# Patient Record
Sex: MALE | Race: BLACK OR AFRICAN AMERICAN | Employment: FULL TIME | URBAN - METROPOLITAN AREA
[De-identification: names, ages, dates, MRNs, and addresses within clinical notes are randomized per-mention and may not be internally consistent; named-entity substitution may affect disease eponyms.]

---

## 2017-02-06 ENCOUNTER — TELEPHONE (OUTPATIENT)
Dept: PRIMARY CARE CLINIC | Age: 42
End: 2017-02-06

## 2017-02-15 ENCOUNTER — OFFICE VISIT (OUTPATIENT)
Dept: PRIMARY CARE CLINIC | Age: 42
End: 2017-02-15

## 2017-02-15 VITALS
WEIGHT: 183 LBS | HEIGHT: 68 IN | HEART RATE: 92 BPM | SYSTOLIC BLOOD PRESSURE: 198 MMHG | TEMPERATURE: 98.6 F | OXYGEN SATURATION: 96 % | DIASTOLIC BLOOD PRESSURE: 110 MMHG | BODY MASS INDEX: 27.74 KG/M2

## 2017-02-15 DIAGNOSIS — N18.30 CKD (CHRONIC KIDNEY DISEASE), STAGE III (HCC): ICD-10-CM

## 2017-02-15 DIAGNOSIS — R80.9 PROTEINURIA: ICD-10-CM

## 2017-02-15 DIAGNOSIS — I10 ESSENTIAL HYPERTENSION: Primary | ICD-10-CM

## 2017-02-15 LAB
A/G RATIO: 1 (ref 1.1–2.2)
ALBUMIN SERPL-MCNC: 3.6 G/DL (ref 3.4–5)
ALP BLD-CCNC: 93 U/L (ref 40–129)
ALT SERPL-CCNC: 16 U/L (ref 10–40)
ANION GAP SERPL CALCULATED.3IONS-SCNC: 19 MMOL/L (ref 3–16)
AST SERPL-CCNC: 7 U/L (ref 15–37)
BASOPHILS ABSOLUTE: 0.1 K/UL (ref 0–0.2)
BASOPHILS RELATIVE PERCENT: 1.3 %
BILIRUB SERPL-MCNC: <0.2 MG/DL (ref 0–1)
BILIRUBIN URINE: NEGATIVE
BLOOD, URINE: ABNORMAL
BUN BLDV-MCNC: 104 MG/DL (ref 7–20)
CALCIUM SERPL-MCNC: 6.2 MG/DL (ref 8.3–10.6)
CHLORIDE BLD-SCNC: 107 MMOL/L (ref 99–110)
CHOLESTEROL, TOTAL: 148 MG/DL (ref 0–199)
CLARITY: CLEAR
CO2: 15 MMOL/L (ref 21–32)
COLOR: YELLOW
CREAT SERPL-MCNC: 13 MG/DL (ref 0.9–1.3)
EOSINOPHILS ABSOLUTE: 0.1 K/UL (ref 0–0.6)
EOSINOPHILS RELATIVE PERCENT: 2 %
EPITHELIAL CELLS, UA: 1 /HPF (ref 0–5)
GFR AFRICAN AMERICAN: 5
GFR NON-AFRICAN AMERICAN: 4
GLOBULIN: 3.5 G/DL
GLUCOSE BLD-MCNC: 78 MG/DL (ref 70–99)
GLUCOSE URINE: NEGATIVE MG/DL
HCT VFR BLD CALC: 21.4 % (ref 40.5–52.5)
HDLC SERPL-MCNC: 41 MG/DL (ref 40–60)
HEMOGLOBIN: 6.8 G/DL (ref 13.5–17.5)
HYALINE CASTS: 0 /HPF (ref 0–8)
KETONES, URINE: NEGATIVE MG/DL
LDL CHOLESTEROL CALCULATED: 90 MG/DL
LEUKOCYTE ESTERASE, URINE: NEGATIVE
LYMPHOCYTES ABSOLUTE: 0.6 K/UL (ref 1–5.1)
LYMPHOCYTES RELATIVE PERCENT: 13.6 %
MCH RBC QN AUTO: 30.5 PG (ref 26–34)
MCHC RBC AUTO-ENTMCNC: 31.7 G/DL (ref 31–36)
MCV RBC AUTO: 96.1 FL (ref 80–100)
MICROSCOPIC EXAMINATION: YES
MONOCYTES ABSOLUTE: 0.6 K/UL (ref 0–1.3)
MONOCYTES RELATIVE PERCENT: 12.6 %
NEUTROPHILS ABSOLUTE: 3.1 K/UL (ref 1.7–7.7)
NEUTROPHILS RELATIVE PERCENT: 70.5 %
NITRITE, URINE: NEGATIVE
PDW BLD-RTO: 12.3 % (ref 12.4–15.4)
PH UA: 6
PLATELET # BLD: 212 K/UL (ref 135–450)
PMV BLD AUTO: 8.7 FL (ref 5–10.5)
POTASSIUM SERPL-SCNC: 6.5 MMOL/L (ref 3.5–5.1)
PROTEIN UA: >=300 MG/DL
RBC # BLD: 2.22 M/UL (ref 4.2–5.9)
RBC UA: 6 /HPF (ref 0–4)
SODIUM BLD-SCNC: 141 MMOL/L (ref 136–145)
SPECIFIC GRAVITY UA: 1.01
TOTAL PROTEIN: 7.1 G/DL (ref 6.4–8.2)
TRIGL SERPL-MCNC: 87 MG/DL (ref 0–150)
TSH SERPL DL<=0.05 MIU/L-ACNC: 1.8 UIU/ML (ref 0.27–4.2)
URINE TYPE: ABNORMAL
UROBILINOGEN, URINE: 0.2 E.U./DL
VLDLC SERPL CALC-MCNC: 17 MG/DL
WBC # BLD: 4.4 K/UL (ref 4–11)
WBC UA: 6 /HPF (ref 0–5)

## 2017-02-15 PROCEDURE — 99215 OFFICE O/P EST HI 40 MIN: CPT | Performed by: INTERNAL MEDICINE

## 2017-02-15 RX ORDER — AMLODIPINE BESYLATE 10 MG/1
10 TABLET ORAL DAILY
Qty: 30 TABLET | Refills: 2 | Status: ON HOLD | OUTPATIENT
Start: 2017-02-15 | End: 2017-02-20 | Stop reason: HOSPADM

## 2017-02-15 RX ORDER — HYDROCHLOROTHIAZIDE 25 MG/1
25 TABLET ORAL DAILY
Qty: 30 TABLET | Refills: 2 | Status: ON HOLD | OUTPATIENT
Start: 2017-02-15 | End: 2017-02-20 | Stop reason: HOSPADM

## 2017-02-15 ASSESSMENT — ENCOUNTER SYMPTOMS
BLURRED VISION: 0
SHORTNESS OF BREATH: 0
RESPIRATORY NEGATIVE: 1
EYE REDNESS: 0
EYE PAIN: 0
PHOTOPHOBIA: 0
ORTHOPNEA: 0
EYE DISCHARGE: 0
GASTROINTESTINAL NEGATIVE: 1
EYE ITCHING: 0

## 2017-02-22 ENCOUNTER — OFFICE VISIT (OUTPATIENT)
Dept: PRIMARY CARE CLINIC | Age: 42
End: 2017-02-22

## 2017-02-22 VITALS
WEIGHT: 174 LBS | SYSTOLIC BLOOD PRESSURE: 147 MMHG | OXYGEN SATURATION: 98 % | HEART RATE: 78 BPM | DIASTOLIC BLOOD PRESSURE: 95 MMHG | HEIGHT: 68 IN | BODY MASS INDEX: 26.37 KG/M2 | TEMPERATURE: 97.5 F

## 2017-02-22 DIAGNOSIS — N18.6 ESRD (END STAGE RENAL DISEASE) (HCC): ICD-10-CM

## 2017-02-22 DIAGNOSIS — N17.9 AKI (ACUTE KIDNEY INJURY) (HCC): ICD-10-CM

## 2017-02-22 DIAGNOSIS — I10 ESSENTIAL HYPERTENSION: ICD-10-CM

## 2017-02-22 PROCEDURE — 99213 OFFICE O/P EST LOW 20 MIN: CPT | Performed by: INTERNAL MEDICINE

## 2017-02-23 ENCOUNTER — HOSPITAL ENCOUNTER (OUTPATIENT)
Dept: OTHER | Age: 42
Discharge: HOME | End: 2017-02-23
Attending: STUDENT IN AN ORGANIZED HEALTH CARE EDUCATION/TRAINING PROGRAM | Admitting: STUDENT IN AN ORGANIZED HEALTH CARE EDUCATION/TRAINING PROGRAM

## 2017-02-23 DIAGNOSIS — N18.30 CKD (CHRONIC KIDNEY DISEASE), STAGE III (HCC): Primary | ICD-10-CM

## 2017-03-17 ENCOUNTER — OFFICE VISIT (OUTPATIENT)
Dept: PRIMARY CARE CLINIC | Age: 42
End: 2017-03-17

## 2017-03-17 VITALS
BODY MASS INDEX: 26.61 KG/M2 | RESPIRATION RATE: 18 BRPM | WEIGHT: 175 LBS | DIASTOLIC BLOOD PRESSURE: 84 MMHG | OXYGEN SATURATION: 99 % | TEMPERATURE: 97.4 F | SYSTOLIC BLOOD PRESSURE: 139 MMHG | HEART RATE: 85 BPM

## 2017-03-17 DIAGNOSIS — N17.9 AKI (ACUTE KIDNEY INJURY) (HCC): ICD-10-CM

## 2017-03-17 DIAGNOSIS — N18.6 ESRD (END STAGE RENAL DISEASE) (HCC): ICD-10-CM

## 2017-03-17 DIAGNOSIS — I10 ESSENTIAL HYPERTENSION: Primary | ICD-10-CM

## 2017-03-17 DIAGNOSIS — N18.30 CKD (CHRONIC KIDNEY DISEASE), STAGE III (HCC): ICD-10-CM

## 2017-03-17 PROCEDURE — 99214 OFFICE O/P EST MOD 30 MIN: CPT | Performed by: INTERNAL MEDICINE

## 2017-03-17 RX ORDER — LOSARTAN POTASSIUM 50 MG/1
50 TABLET ORAL DAILY
Qty: 30 TABLET | Refills: 0 | Status: CANCELLED | OUTPATIENT
Start: 2017-03-17

## 2017-03-17 RX ORDER — NIFEDIPINE 90 MG/1
90 TABLET, EXTENDED RELEASE ORAL DAILY
Qty: 30 TABLET | Refills: 5 | Status: SHIPPED | OUTPATIENT
Start: 2017-03-17 | End: 2017-06-26 | Stop reason: SDUPTHER

## 2017-03-17 RX ORDER — LOSARTAN POTASSIUM 50 MG/1
50 TABLET ORAL DAILY
Qty: 30 TABLET | Refills: 5 | Status: SHIPPED | OUTPATIENT
Start: 2017-03-17 | End: 2017-06-26 | Stop reason: SDUPTHER

## 2017-03-17 RX ORDER — METOPROLOL SUCCINATE 25 MG/1
25 TABLET, EXTENDED RELEASE ORAL DAILY
Qty: 30 TABLET | Refills: 5 | Status: SHIPPED | OUTPATIENT
Start: 2017-03-17 | End: 2017-08-22 | Stop reason: SDUPTHER

## 2017-03-17 ASSESSMENT — ENCOUNTER SYMPTOMS
RESPIRATORY NEGATIVE: 1
EYE REDNESS: 0
GASTROINTESTINAL NEGATIVE: 1
EYE DISCHARGE: 0
PHOTOPHOBIA: 0
EYE ITCHING: 0
ORTHOPNEA: 0
BLURRED VISION: 0
SHORTNESS OF BREATH: 0
EYE PAIN: 0

## 2017-03-17 ASSESSMENT — PATIENT HEALTH QUESTIONNAIRE - PHQ9
SUM OF ALL RESPONSES TO PHQ QUESTIONS 1-9: 0
1. LITTLE INTEREST OR PLEASURE IN DOING THINGS: 0
2. FEELING DOWN, DEPRESSED OR HOPELESS: 0
SUM OF ALL RESPONSES TO PHQ9 QUESTIONS 1 & 2: 0

## 2017-03-30 ENCOUNTER — TELEPHONE (OUTPATIENT)
Dept: PRIMARY CARE CLINIC | Age: 42
End: 2017-03-30

## 2017-04-03 RX ORDER — CALCIUM ACETATE 667 MG/1
CAPSULE ORAL
Qty: 60 CAPSULE | Refills: 2 | OUTPATIENT
Start: 2017-04-03

## 2017-04-04 DIAGNOSIS — N18.30 CKD (CHRONIC KIDNEY DISEASE), STAGE III (HCC): Primary | ICD-10-CM

## 2017-04-20 ENCOUNTER — OFFICE VISIT (OUTPATIENT)
Dept: VASCULAR SURGERY | Age: 42
End: 2017-04-20

## 2017-04-20 VITALS
WEIGHT: 171 LBS | HEIGHT: 68 IN | DIASTOLIC BLOOD PRESSURE: 88 MMHG | BODY MASS INDEX: 25.91 KG/M2 | SYSTOLIC BLOOD PRESSURE: 134 MMHG

## 2017-04-20 DIAGNOSIS — N18.6 ESRD (END STAGE RENAL DISEASE) (HCC): Primary | ICD-10-CM

## 2017-04-20 PROCEDURE — 99205 OFFICE O/P NEW HI 60 MIN: CPT | Performed by: SURGERY

## 2017-04-20 ASSESSMENT — ENCOUNTER SYMPTOMS
ALLERGIC/IMMUNOLOGIC NEGATIVE: 1
EYES NEGATIVE: 1
RESPIRATORY NEGATIVE: 1
GASTROINTESTINAL NEGATIVE: 1

## 2017-05-23 ENCOUNTER — HOSPITAL ENCOUNTER (OUTPATIENT)
Dept: PREADMISSION TESTING | Age: 42
Discharge: HOME | End: 2017-05-23
Admitting: SURGERY

## 2017-05-23 ENCOUNTER — TELEPHONE (OUTPATIENT)
Dept: CARDIOTHORACIC SURGERY | Age: 42
End: 2017-05-23

## 2017-05-23 ENCOUNTER — TELEPHONE (OUTPATIENT)
Dept: VASCULAR SURGERY | Age: 42
End: 2017-05-23

## 2017-05-23 ENCOUNTER — HOSPITAL ENCOUNTER (OUTPATIENT)
Dept: OTHER | Age: 42
Discharge: HOME | End: 2017-05-23
Attending: SURGERY | Admitting: SURGERY

## 2017-05-23 VITALS — WEIGHT: 170 LBS | HEIGHT: 68 IN | BODY MASS INDEX: 25.76 KG/M2

## 2017-05-23 DIAGNOSIS — Z01.811 PRE-OP CHEST EXAM: ICD-10-CM

## 2017-05-23 LAB
A/G RATIO: 1.1 (ref 1.1–2.2)
ALBUMIN SERPL-MCNC: 4.6 G/DL (ref 3.4–5)
ALP BLD-CCNC: 116 U/L (ref 40–129)
ALT SERPL-CCNC: 23 U/L (ref 10–40)
ANION GAP SERPL CALCULATED.3IONS-SCNC: 10 MMOL/L (ref 3–16)
AST SERPL-CCNC: 14 U/L (ref 15–37)
BILIRUB SERPL-MCNC: 0.3 MG/DL (ref 0–1)
BILIRUBIN URINE: NEGATIVE
BLOOD, URINE: ABNORMAL
BUN BLDV-MCNC: 27 MG/DL (ref 7–20)
CALCIUM SERPL-MCNC: 9.5 MG/DL (ref 8.3–10.6)
CHLORIDE BLD-SCNC: 96 MMOL/L (ref 99–110)
CLARITY: ABNORMAL
CO2: 32 MMOL/L (ref 21–32)
COLOR: YELLOW
CREAT SERPL-MCNC: 8.1 MG/DL (ref 0.9–1.3)
EKG ATRIAL RATE: 62 BPM
EKG DIAGNOSIS: NORMAL
EKG P AXIS: 76 DEGREES
EKG P-R INTERVAL: 148 MS
EKG Q-T INTERVAL: 376 MS
EKG QRS DURATION: 80 MS
EKG QTC CALCULATION (BAZETT): 381 MS
EKG R AXIS: 52 DEGREES
EKG T AXIS: 43 DEGREES
EKG VENTRICULAR RATE: 62 BPM
EPITHELIAL CELLS, UA: 0 /HPF (ref 0–5)
GFR AFRICAN AMERICAN: 9
GFR NON-AFRICAN AMERICAN: 7
GLOBULIN: 4.3 G/DL
GLUCOSE BLD-MCNC: 81 MG/DL (ref 70–99)
GLUCOSE URINE: NEGATIVE MG/DL
HCT VFR BLD CALC: 35.3 % (ref 40.5–52.5)
HEMOGLOBIN: 11.5 G/DL (ref 13.5–17.5)
HYALINE CASTS: 0 /LPF (ref 0–8)
INR BLD: 0.92 (ref 0.85–1.15)
KETONES, URINE: NEGATIVE MG/DL
LEUKOCYTE ESTERASE, URINE: ABNORMAL
MCH RBC QN AUTO: 31.4 PG (ref 26–34)
MCHC RBC AUTO-ENTMCNC: 32.5 G/DL (ref 31–36)
MCV RBC AUTO: 96.5 FL (ref 80–100)
MICROSCOPIC EXAMINATION: YES
NITRITE, URINE: NEGATIVE
PDW BLD-RTO: 15.9 % (ref 12.4–15.4)
PH UA: 8.5
PLATELET # BLD: 317 K/UL (ref 135–450)
PMV BLD AUTO: 8.1 FL (ref 5–10.5)
POTASSIUM SERPL-SCNC: 4.8 MMOL/L (ref 3.5–5.1)
PROTEIN UA: >=300 MG/DL
PROTHROMBIN TIME: 10.4 SEC (ref 9.6–13)
RBC # BLD: 3.66 M/UL (ref 4.2–5.9)
RBC UA: 2 /HPF (ref 0–4)
SODIUM BLD-SCNC: 138 MMOL/L (ref 136–145)
SPECIFIC GRAVITY UA: 1.01
TOTAL PROTEIN: 8.9 G/DL (ref 6.4–8.2)
URINE TYPE: ABNORMAL
UROBILINOGEN, URINE: 0.2 E.U./DL
WBC # BLD: 5 K/UL (ref 4–11)
WBC UA: 12 /HPF (ref 0–5)

## 2017-05-23 PROCEDURE — 93010 ELECTROCARDIOGRAM REPORT: CPT | Performed by: INTERNAL MEDICINE

## 2017-05-23 RX ORDER — SODIUM CHLORIDE, SODIUM LACTATE, POTASSIUM CHLORIDE, CALCIUM CHLORIDE 600; 310; 30; 20 MG/100ML; MG/100ML; MG/100ML; MG/100ML
INJECTION, SOLUTION INTRAVENOUS CONTINUOUS
Status: CANCELLED | OUTPATIENT
Start: 2017-05-31

## 2017-05-23 RX ORDER — LIDOCAINE HYDROCHLORIDE 10 MG/ML
0.5 INJECTION, SOLUTION EPIDURAL; INFILTRATION; INTRACAUDAL; PERINEURAL ONCE
Status: CANCELLED | OUTPATIENT
Start: 2017-05-31

## 2017-05-31 ENCOUNTER — HOSPITAL ENCOUNTER (OUTPATIENT)
Dept: OTHER | Age: 42
Discharge: HOME | End: 2017-05-31
Attending: SURGERY | Admitting: SURGERY

## 2017-05-31 ENCOUNTER — HOSPITAL ENCOUNTER (OUTPATIENT)
Dept: SURGERY | Age: 42
Discharge: HOME | End: 2017-05-31
Admitting: SURGERY

## 2017-06-14 ENCOUNTER — HOSPITAL ENCOUNTER (OUTPATIENT)
Dept: SURGERY | Age: 42
Discharge: HOME | End: 2017-06-14
Attending: SURGERY | Admitting: SURGERY

## 2017-06-14 VITALS
DIASTOLIC BLOOD PRESSURE: 100 MMHG | SYSTOLIC BLOOD PRESSURE: 172 MMHG | OXYGEN SATURATION: 98 % | RESPIRATION RATE: 12 BRPM | HEART RATE: 68 BPM | BODY MASS INDEX: 26.15 KG/M2 | TEMPERATURE: 97.4 F | WEIGHT: 171.96 LBS

## 2017-06-14 LAB
ANION GAP SERPL CALCULATED.3IONS-SCNC: 17 MMOL/L (ref 3–16)
BUN BLDV-MCNC: 47 MG/DL (ref 7–20)
CALCIUM SERPL-MCNC: 8.9 MG/DL (ref 8.3–10.6)
CHLORIDE BLD-SCNC: 97 MMOL/L (ref 99–110)
CO2: 22 MMOL/L (ref 21–32)
CREAT SERPL-MCNC: 11.8 MG/DL (ref 0.9–1.3)
GFR AFRICAN AMERICAN: 6
GFR NON-AFRICAN AMERICAN: 5
GLUCOSE BLD-MCNC: 89 MG/DL (ref 70–99)
POTASSIUM SERPL-SCNC: 5.6 MMOL/L (ref 3.5–5.1)
SODIUM BLD-SCNC: 136 MMOL/L (ref 136–145)

## 2017-06-14 PROCEDURE — 36825 ARTERY-VEIN AUTOGRAFT: CPT | Performed by: SURGERY

## 2017-06-14 RX ORDER — ONDANSETRON 2 MG/ML
4 INJECTION INTRAMUSCULAR; INTRAVENOUS
Status: ACTIVE | OUTPATIENT
Start: 2017-06-14 | End: 2017-06-14

## 2017-06-14 RX ORDER — LABETALOL HYDROCHLORIDE 5 MG/ML
5 INJECTION, SOLUTION INTRAVENOUS ONCE
Status: COMPLETED | OUTPATIENT
Start: 2017-06-14 | End: 2017-06-14

## 2017-06-14 RX ORDER — MEPERIDINE HYDROCHLORIDE 25 MG/ML
12.5 INJECTION INTRAMUSCULAR; INTRAVENOUS; SUBCUTANEOUS EVERY 5 MIN PRN
Status: DISCONTINUED | OUTPATIENT
Start: 2017-06-14 | End: 2017-06-15 | Stop reason: HOSPADM

## 2017-06-14 RX ORDER — LABETALOL HYDROCHLORIDE 5 MG/ML
5 INJECTION, SOLUTION INTRAVENOUS EVERY 10 MIN PRN
Status: DISCONTINUED | OUTPATIENT
Start: 2017-06-14 | End: 2017-06-15 | Stop reason: HOSPADM

## 2017-06-14 RX ORDER — CEFAZOLIN SODIUM 2 G/100ML
2 INJECTION, SOLUTION INTRAVENOUS ONCE
Status: COMPLETED | OUTPATIENT
Start: 2017-06-14 | End: 2017-06-14

## 2017-06-14 RX ORDER — DIPHENHYDRAMINE HYDROCHLORIDE 50 MG/ML
12.5 INJECTION INTRAMUSCULAR; INTRAVENOUS
Status: ACTIVE | OUTPATIENT
Start: 2017-06-14 | End: 2017-06-14

## 2017-06-14 RX ORDER — OXYCODONE HYDROCHLORIDE AND ACETAMINOPHEN 5; 325 MG/1; MG/1
1 TABLET ORAL PRN
Status: ACTIVE | OUTPATIENT
Start: 2017-06-14 | End: 2017-06-14

## 2017-06-14 RX ORDER — HYDRALAZINE HYDROCHLORIDE 20 MG/ML
5 INJECTION INTRAMUSCULAR; INTRAVENOUS ONCE
Status: COMPLETED | OUTPATIENT
Start: 2017-06-14 | End: 2017-06-14

## 2017-06-14 RX ORDER — SODIUM CHLORIDE 9 MG/ML
INJECTION, SOLUTION INTRAVENOUS CONTINUOUS
Status: DISCONTINUED | OUTPATIENT
Start: 2017-06-14 | End: 2017-06-15 | Stop reason: HOSPADM

## 2017-06-14 RX ORDER — HYDRALAZINE HYDROCHLORIDE 20 MG/ML
INJECTION INTRAMUSCULAR; INTRAVENOUS
Status: COMPLETED
Start: 2017-06-14 | End: 2017-06-14

## 2017-06-14 RX ORDER — OXYCODONE HYDROCHLORIDE AND ACETAMINOPHEN 5; 325 MG/1; MG/1
2 TABLET ORAL PRN
Status: ACTIVE | OUTPATIENT
Start: 2017-06-14 | End: 2017-06-14

## 2017-06-14 RX ORDER — HYDROCODONE BITARTRATE AND ACETAMINOPHEN 5; 325 MG/1; MG/1
2 TABLET ORAL EVERY 6 HOURS PRN
Qty: 25 TABLET | Refills: 0 | Status: SHIPPED | OUTPATIENT
Start: 2017-06-14 | End: 2017-12-26

## 2017-06-14 RX ORDER — HYDROMORPHONE HCL 110MG/55ML
0.5 PATIENT CONTROLLED ANALGESIA SYRINGE INTRAVENOUS EVERY 5 MIN PRN
Status: DISCONTINUED | OUTPATIENT
Start: 2017-06-14 | End: 2017-06-15 | Stop reason: HOSPADM

## 2017-06-14 RX ORDER — LIDOCAINE HYDROCHLORIDE 10 MG/ML
0.5 INJECTION, SOLUTION EPIDURAL; INFILTRATION; INTRACAUDAL; PERINEURAL ONCE
Status: DISCONTINUED | OUTPATIENT
Start: 2017-06-14 | End: 2017-06-15 | Stop reason: HOSPADM

## 2017-06-14 RX ORDER — FENTANYL CITRATE 50 UG/ML
25 INJECTION, SOLUTION INTRAMUSCULAR; INTRAVENOUS EVERY 5 MIN PRN
Status: DISCONTINUED | OUTPATIENT
Start: 2017-06-14 | End: 2017-06-15 | Stop reason: HOSPADM

## 2017-06-14 RX ADMIN — CEFAZOLIN SODIUM 2 G: 2 INJECTION, SOLUTION INTRAVENOUS at 13:11

## 2017-06-14 RX ADMIN — HYDRALAZINE HYDROCHLORIDE 5 MG: 20 INJECTION INTRAMUSCULAR; INTRAVENOUS at 16:10

## 2017-06-14 RX ADMIN — SODIUM CHLORIDE: 9 INJECTION, SOLUTION INTRAVENOUS at 11:38

## 2017-06-14 RX ADMIN — LABETALOL HYDROCHLORIDE 5 MG: 5 INJECTION, SOLUTION INTRAVENOUS at 15:32

## 2017-06-14 ASSESSMENT — PAIN - FUNCTIONAL ASSESSMENT: PAIN_FUNCTIONAL_ASSESSMENT: 0-10

## 2017-06-16 ENCOUNTER — OFFICE VISIT (OUTPATIENT)
Dept: PRIMARY CARE CLINIC | Age: 42
End: 2017-06-16

## 2017-06-16 VITALS
HEART RATE: 71 BPM | TEMPERATURE: 98.4 F | SYSTOLIC BLOOD PRESSURE: 136 MMHG | WEIGHT: 175 LBS | OXYGEN SATURATION: 98 % | BODY MASS INDEX: 26.61 KG/M2 | RESPIRATION RATE: 18 BRPM | DIASTOLIC BLOOD PRESSURE: 81 MMHG

## 2017-06-16 DIAGNOSIS — Z11.4 SCREENING FOR HIV (HUMAN IMMUNODEFICIENCY VIRUS): ICD-10-CM

## 2017-06-16 DIAGNOSIS — I10 ESSENTIAL HYPERTENSION: Primary | ICD-10-CM

## 2017-06-16 DIAGNOSIS — N18.6 ESRD (END STAGE RENAL DISEASE) ON DIALYSIS (HCC): ICD-10-CM

## 2017-06-16 DIAGNOSIS — Z23 NEED FOR TDAP VACCINATION: ICD-10-CM

## 2017-06-16 DIAGNOSIS — Z13.5 SCREENING FOR GLAUCOMA: ICD-10-CM

## 2017-06-16 DIAGNOSIS — Z99.2 ESRD (END STAGE RENAL DISEASE) ON DIALYSIS (HCC): ICD-10-CM

## 2017-06-16 DIAGNOSIS — Z11.4 SCREENING FOR HIV WITHOUT PRESENCE OF RISK FACTORS: ICD-10-CM

## 2017-06-16 PROCEDURE — 99213 OFFICE O/P EST LOW 20 MIN: CPT | Performed by: INTERNAL MEDICINE

## 2017-06-16 ASSESSMENT — ENCOUNTER SYMPTOMS
SHORTNESS OF BREATH: 0
BLURRED VISION: 0
RESPIRATORY NEGATIVE: 1
ORTHOPNEA: 0
EYE DISCHARGE: 0
EYES NEGATIVE: 1

## 2017-06-22 ENCOUNTER — OFFICE VISIT (OUTPATIENT)
Dept: VASCULAR SURGERY | Age: 42
End: 2017-06-22

## 2017-06-22 VITALS
WEIGHT: 171 LBS | SYSTOLIC BLOOD PRESSURE: 110 MMHG | HEIGHT: 68 IN | BODY MASS INDEX: 25.91 KG/M2 | DIASTOLIC BLOOD PRESSURE: 78 MMHG

## 2017-06-22 DIAGNOSIS — N18.6 ESRD (END STAGE RENAL DISEASE) (HCC): Primary | ICD-10-CM

## 2017-06-22 PROCEDURE — 99024 POSTOP FOLLOW-UP VISIT: CPT | Performed by: SURGERY

## 2017-06-26 DIAGNOSIS — I10 ESSENTIAL HYPERTENSION: ICD-10-CM

## 2017-06-26 DIAGNOSIS — N18.30 CKD (CHRONIC KIDNEY DISEASE), STAGE III (HCC): ICD-10-CM

## 2017-06-27 RX ORDER — NIFEDIPINE 90 MG/1
90 TABLET, EXTENDED RELEASE ORAL DAILY
Qty: 30 TABLET | Refills: 5 | Status: SHIPPED | OUTPATIENT
Start: 2017-06-27 | End: 2017-07-27 | Stop reason: SDUPTHER

## 2017-06-27 RX ORDER — LOSARTAN POTASSIUM 50 MG/1
50 TABLET ORAL DAILY
Qty: 30 TABLET | Refills: 5 | Status: SHIPPED | OUTPATIENT
Start: 2017-06-27 | End: 2017-07-27 | Stop reason: SDUPTHER

## 2017-07-13 ENCOUNTER — OFFICE VISIT (OUTPATIENT)
Dept: VASCULAR SURGERY | Age: 42
End: 2017-07-13

## 2017-07-13 VITALS
HEIGHT: 68 IN | WEIGHT: 171 LBS | SYSTOLIC BLOOD PRESSURE: 130 MMHG | DIASTOLIC BLOOD PRESSURE: 68 MMHG | BODY MASS INDEX: 25.91 KG/M2

## 2017-07-13 DIAGNOSIS — N18.6 ESRD (END STAGE RENAL DISEASE) (HCC): Primary | ICD-10-CM

## 2017-07-13 PROCEDURE — 99024 POSTOP FOLLOW-UP VISIT: CPT | Performed by: SURGERY

## 2017-07-13 RX ORDER — SEVELAMER CARBONATE 800 MG/1
TABLET, FILM COATED ORAL
Refills: 6 | COMMUNITY
Start: 2017-07-11 | End: 2018-02-20

## 2017-07-27 DIAGNOSIS — I10 ESSENTIAL HYPERTENSION: ICD-10-CM

## 2017-07-27 DIAGNOSIS — N18.30 CKD (CHRONIC KIDNEY DISEASE), STAGE III (HCC): ICD-10-CM

## 2017-07-27 RX ORDER — NIFEDIPINE 90 MG/1
90 TABLET, EXTENDED RELEASE ORAL DAILY
Qty: 30 TABLET | Refills: 5 | Status: ON HOLD | OUTPATIENT
Start: 2017-07-27 | End: 2017-08-29 | Stop reason: HOSPADM

## 2017-07-27 RX ORDER — LOSARTAN POTASSIUM 50 MG/1
50 TABLET ORAL DAILY
Qty: 30 TABLET | Refills: 5 | Status: SHIPPED | OUTPATIENT
Start: 2017-07-27 | End: 2017-09-25 | Stop reason: SDUPTHER

## 2017-08-22 DIAGNOSIS — I10 ESSENTIAL HYPERTENSION: ICD-10-CM

## 2017-08-22 RX ORDER — METOPROLOL SUCCINATE 25 MG/1
25 TABLET, EXTENDED RELEASE ORAL DAILY
Qty: 30 TABLET | Refills: 5 | Status: SHIPPED | OUTPATIENT
Start: 2017-08-22 | End: 2018-05-18 | Stop reason: SDUPTHER

## 2017-08-27 PROBLEM — N18.5 ANEMIA OF CHRONIC RENAL FAILURE, STAGE 5 (HCC): Status: ACTIVE | Noted: 2017-08-27

## 2017-08-29 ENCOUNTER — TELEPHONE (OUTPATIENT)
Dept: SLEEP MEDICINE | Age: 42
End: 2017-08-29

## 2017-09-15 ENCOUNTER — OFFICE VISIT (OUTPATIENT)
Dept: PRIMARY CARE CLINIC | Age: 42
End: 2017-09-15

## 2017-09-15 VITALS
BODY MASS INDEX: 25.76 KG/M2 | SYSTOLIC BLOOD PRESSURE: 117 MMHG | HEART RATE: 71 BPM | OXYGEN SATURATION: 96 % | RESPIRATION RATE: 18 BRPM | HEIGHT: 68 IN | TEMPERATURE: 97.6 F | DIASTOLIC BLOOD PRESSURE: 74 MMHG | WEIGHT: 170 LBS

## 2017-09-15 DIAGNOSIS — N18.6 ESRD (END STAGE RENAL DISEASE) ON DIALYSIS (HCC): ICD-10-CM

## 2017-09-15 DIAGNOSIS — I48.91 ATRIAL FIBRILLATION WITH RVR (HCC): ICD-10-CM

## 2017-09-15 DIAGNOSIS — Z99.2 ESRD (END STAGE RENAL DISEASE) ON DIALYSIS (HCC): ICD-10-CM

## 2017-09-15 PROCEDURE — 99213 OFFICE O/P EST LOW 20 MIN: CPT | Performed by: INTERNAL MEDICINE

## 2017-09-25 DIAGNOSIS — I10 ESSENTIAL HYPERTENSION: ICD-10-CM

## 2017-09-25 DIAGNOSIS — N18.30 CKD (CHRONIC KIDNEY DISEASE), STAGE III (HCC): ICD-10-CM

## 2017-09-25 RX ORDER — LOSARTAN POTASSIUM 50 MG/1
50 TABLET ORAL DAILY
Qty: 30 TABLET | Refills: 5 | Status: SHIPPED | OUTPATIENT
Start: 2017-09-25 | End: 2017-10-25 | Stop reason: SDUPTHER

## 2017-10-05 ENCOUNTER — INITIAL CONSULT (OUTPATIENT)
Dept: PULMONOLOGY | Age: 42
End: 2017-10-05

## 2017-10-05 VITALS
WEIGHT: 170 LBS | HEART RATE: 70 BPM | DIASTOLIC BLOOD PRESSURE: 70 MMHG | SYSTOLIC BLOOD PRESSURE: 111 MMHG | BODY MASS INDEX: 25.76 KG/M2 | HEIGHT: 68 IN | OXYGEN SATURATION: 97 %

## 2017-10-05 DIAGNOSIS — R06.83 SNORING: Primary | Chronic | ICD-10-CM

## 2017-10-05 DIAGNOSIS — N18.30 CKD (CHRONIC KIDNEY DISEASE), STAGE III (HCC): Chronic | ICD-10-CM

## 2017-10-05 DIAGNOSIS — I48.91 ATRIAL FIBRILLATION WITH RVR (HCC): Chronic | ICD-10-CM

## 2017-10-05 DIAGNOSIS — I10 ESSENTIAL HYPERTENSION: Chronic | ICD-10-CM

## 2017-10-05 DIAGNOSIS — G47.19 DAYTIME HYPERSOMNOLENCE: Chronic | ICD-10-CM

## 2017-10-05 PROCEDURE — 99244 OFF/OP CNSLTJ NEW/EST MOD 40: CPT | Performed by: NURSE PRACTITIONER

## 2017-10-05 ASSESSMENT — ENCOUNTER SYMPTOMS
SHORTNESS OF BREATH: 0
ABDOMINAL PAIN: 0
SINUS PRESSURE: 0
COUGH: 0
RHINORRHEA: 0
APNEA: 0
ABDOMINAL DISTENTION: 0

## 2017-10-05 ASSESSMENT — SLEEP AND FATIGUE QUESTIONNAIRES
NECK CIRCUMFERENCE (INCHES): 15
HOW LIKELY ARE YOU TO NOD OFF OR FALL ASLEEP IN A CAR, WHILE STOPPED FOR A FEW MINUTES IN TRAFFIC: 0
HOW LIKELY ARE YOU TO NOD OFF OR FALL ASLEEP WHILE SITTING AND READING: 0
HOW LIKELY ARE YOU TO NOD OFF OR FALL ASLEEP WHILE LYING DOWN TO REST IN THE AFTERNOON WHEN CIRCUMSTANCES PERMIT: 0
HOW LIKELY ARE YOU TO NOD OFF OR FALL ASLEEP WHILE SITTING AND TALKING TO SOMEONE: 0
HOW LIKELY ARE YOU TO NOD OFF OR FALL ASLEEP WHILE SITTING INACTIVE IN A PUBLIC PLACE: 0
HOW LIKELY ARE YOU TO NOD OFF OR FALL ASLEEP WHEN YOU ARE A PASSENGER IN A CAR FOR AN HOUR WITHOUT A BREAK: 0
HOW LIKELY ARE YOU TO NOD OFF OR FALL ASLEEP WHILE WATCHING TV: 0
HOW LIKELY ARE YOU TO NOD OFF OR FALL ASLEEP WHILE SITTING QUIETLY AFTER LUNCH WITHOUT ALCOHOL: 0
ESS TOTAL SCORE: 0

## 2017-10-05 NOTE — LETTER
[facility] Sleep Medicine  [address] [facility]  Suite [facility][note 3]  [place] [address]  Phone: [phone]  Fax: [phone]    October 5, 2017       Patient: Brooke Foss II   MR Number: [id number]   YOB: 1975   Date of Visit: 10/5/2017       Brooke Foss II was seen for a follow up visit today. Attached is a copy of his visit today:      If you have questions or concerns, please do not hesitate to call me. I look forward to following Donna Norman along with you.     Sincerely,      Jen Zimmerman CNP    CC providers:  Vinay Ibrahim, [address] [facility] [address] [facility]

## 2017-10-05 NOTE — MR AVS SNAPSHOT
After Visit Summary             Joce Terrazas II   10/5/2017 1:20 PM   Initial consult    Description:  Male : 1975   Provider:  Rodriguez Parra CNP   Department:  [facility] Sleep Medicine              Your Follow-Up and Future Appointments         Below is a list of your follow-up and future appointments. This may not be a complete list as you may have made appointments directly with providers that we are not aware of or your providers may have made some for you. Please call your providers to confirm appointments. It is important to keep your appointments. Please bring your current insurance card, photo ID, co-pay, and all medication bottles to your appointment. If self-pay, payment is expected at the time of service. Your To-Do List     Future Appointments Provider Department Dept Phone    10/16/2017 1:30 PM Maggie Buerger,  [address] [phone]    Please arrive 15 minutes prior to appointment, bring photo ID and insurance card. 12/15/2017 10:00 AM Darius Delgado  [address] [facility] [phone]    Please arrive 15 minutes prior to appointment, bring photo ID and insurance card. Future Orders Complete By Expires    Home Sleep Study [SLE4 Custom]  10/5/2017 4/3/2018    Scheduling Instructions:    [employer]ics Night One    Comments:    Run two nights    Follow-Up    Return in about 10 weeks (around 2017) for [facility] NP.          Information from Your Visit        Department     Name Address Phone Fax    [address] [facility] [address] [place] [id number] [phone]      You Were Seen for:         Comments    Snoring   [[id number]]   Needing follow up       Vital Signs     Blood Pressure Pulse Height Weight Oxygen Saturation Body Mass Index    111/70 70 5' 8\" (1.727 m) 170 lb (77.1 kg) 97% 25.85 kg/m2    Smoking Status                   Former Smoker

## 2017-10-16 ENCOUNTER — OFFICE VISIT (OUTPATIENT)
Dept: CARDIOLOGY CLINIC | Age: 42
End: 2017-10-16

## 2017-10-16 VITALS
DIASTOLIC BLOOD PRESSURE: 98 MMHG | SYSTOLIC BLOOD PRESSURE: 158 MMHG | BODY MASS INDEX: 26.15 KG/M2 | WEIGHT: 172 LBS | HEART RATE: 54 BPM

## 2017-10-16 DIAGNOSIS — I10 ESSENTIAL HYPERTENSION: ICD-10-CM

## 2017-10-16 DIAGNOSIS — Z99.2 ESRD (END STAGE RENAL DISEASE) ON DIALYSIS (HCC): ICD-10-CM

## 2017-10-16 DIAGNOSIS — I48.0 PAROXYSMAL ATRIAL FIBRILLATION (HCC): Primary | ICD-10-CM

## 2017-10-16 DIAGNOSIS — N18.6 ESRD (END STAGE RENAL DISEASE) ON DIALYSIS (HCC): ICD-10-CM

## 2017-10-16 PROCEDURE — 93000 ELECTROCARDIOGRAM COMPLETE: CPT | Performed by: NURSE PRACTITIONER

## 2017-10-16 PROCEDURE — 99213 OFFICE O/P EST LOW 20 MIN: CPT | Performed by: NURSE PRACTITIONER

## 2017-10-16 NOTE — PROGRESS NOTES
negative for - headaches, sore throat   · Allergy and Immunology ROS: negative for - hives  · Hematological and Lymphatic ROS: negative for - bleeding problems, blood clots, bruising or jaundice  · Endocrine ROS: negative for - skin changes, temperature intolerance or unexpected weight changes  · Respiratory ROS: negative for - cough, sputum, wheezing  · Cardiovascular ROS: Per HPI. · Gastrointestinal ROS: negative for - abdominal pain, diarrhea, nausea/vomiting, bleeding   · Genito-Urinary ROS: negative for - dysuria or incontinence  · Musculoskeletal ROS: negative for - joint swelling   · Neurological ROS: negative for - confusion, numbness/tingling, seizures, weakness  · Dermatological ROS: negative for - rash    Physical Examination:  Vitals:    10/16/17 1335   BP: (!) 158/98   Pulse: 54       Constitutional and General Appearance: Warm and dry, no apparent distress, normal coloration  HEENT:  Normocephalic, atraumatic  Respiratory:  · Normal excursion and expansion without use of accessory muscles  · Resp Auscultation: Normal breath sounds without dullness  Cardiovascular:  · The apical impulses not displaced  · Heart tones are crisp and normal  · JVP less than 8 cm H2O  · Regular rate and rhythm, S1, S2  · Peripheral pulses are symmetrical and full  · There is no clubbing, cyanosis of the extremities. · No edema  · Pedal Pulses: 2+ and equal   Abdomen:  · No masses or tenderness  · Liver/Spleen: No Abnormalities Noted  Neurological/Psychiatric:  · Alert and oriented in all spheres  · Moves all extremities well  · Exhibits normal gait balance and coordination  · No abnormalities of mood, affect, memory, mentation, or behavior are noted    Labs:  Reviewed.      Medication:  Current Outpatient Prescriptions   Medication Sig Dispense Refill    losartan (COZAAR) 50 MG tablet Take 1 tablet by mouth daily 30 tablet 5    vitamin D (CHOLECALCIFEROL) 5000 units CAPS capsule Take 1 capsule by mouth daily 30 capsule 5  diltiazem (CARDIZEM CD) 120 MG extended release capsule Take 1 capsule by mouth daily 30 capsule 3    aspirin EC 81 MG EC tablet Take 1 tablet by mouth daily 30 tablet 3    metoprolol succinate (TOPROL XL) 25 MG extended release tablet Take 1 tablet by mouth daily 30 tablet 5    RENVELA 800 MG tablet TK 2 TS PO TID WITH MEALS  6    HYDROcodone-acetaminophen (NORCO) 5-325 MG per tablet Take 2 tablets by mouth every 6 hours as needed for Pain . 25 tablet 0    darbepoetin dalia-polysorbate (ARANESP) 200 MCG/0.4ML SOSY injection Infuse 0.4 mLs intravenously once a week On fridays. 1.68 mL 0     No current facility-administered medications for this visit. 1. Paroxysmal atrial fibrillation ([address]    2. Essential hypertension    3. ESRD (end stage renal disease) on dialysis [facility])         Assessment and plan:   -Paroxysmal atrial fibrillation   -new onset in August 2017   -remains in sinus    -continue Cardizem and Metoprolol   -on coumadin    -HTN   -elevated   -will defer to Nephrology given ESRD and possible transplant   -discussed with pt     -ESRD   -HD on M,W,F evenings    Thank you for allowing me to participate in the care of Melanie Jeong II. Further evaluation will be based upon the patient's clinical course and testing results. All questions and concerns were addressed to the patient/family. Alternatives to my treatment were discussed. I have discussed the above stated plan and the patient verbalized understanding and agreed with the plan.     Starr David, [address] [facility]

## 2017-10-25 DIAGNOSIS — N18.30 CKD (CHRONIC KIDNEY DISEASE), STAGE III (HCC): ICD-10-CM

## 2017-10-25 DIAGNOSIS — I10 ESSENTIAL HYPERTENSION: ICD-10-CM

## 2017-10-26 RX ORDER — LOSARTAN POTASSIUM 50 MG/1
50 TABLET ORAL DAILY
Qty: 30 TABLET | Refills: 5 | Status: SHIPPED | OUTPATIENT
Start: 2017-10-26 | End: 2018-03-06 | Stop reason: SDUPTHER

## 2017-11-30 DIAGNOSIS — I10 ESSENTIAL HYPERTENSION: ICD-10-CM

## 2017-11-30 DIAGNOSIS — N18.30 CKD (CHRONIC KIDNEY DISEASE), STAGE III (HCC): ICD-10-CM

## 2017-11-30 NOTE — TELEPHONE ENCOUNTER
From: Kristie Varghese II  Sent: 11/23/2017 4:59 PM EST  Subject: Medication Renewal Request    Kristie Varghese II would like a refill of the following medications:  vitamin D (CHOLECALCIFEROL) 5000 units CAPS capsule Gabriel Rudd MD]    Preferred pharmacy: [address], [place]  [address] [phone] - F [phone]    Comment:

## 2017-12-15 ENCOUNTER — OFFICE VISIT (OUTPATIENT)
Dept: PRIMARY CARE CLINIC | Age: 42
End: 2017-12-15

## 2017-12-15 VITALS
OXYGEN SATURATION: 98 % | SYSTOLIC BLOOD PRESSURE: 138 MMHG | BODY MASS INDEX: 25.79 KG/M2 | DIASTOLIC BLOOD PRESSURE: 78 MMHG | HEART RATE: 70 BPM | HEIGHT: 68 IN | TEMPERATURE: 97.9 F | WEIGHT: 170.2 LBS

## 2017-12-15 DIAGNOSIS — Z12.11 SCREENING FOR COLON CANCER: Primary | ICD-10-CM

## 2017-12-15 DIAGNOSIS — Z99.2 ESRD (END STAGE RENAL DISEASE) ON DIALYSIS (HCC): ICD-10-CM

## 2017-12-15 DIAGNOSIS — I10 ESSENTIAL HYPERTENSION: ICD-10-CM

## 2017-12-15 DIAGNOSIS — I48.0 PAROXYSMAL ATRIAL FIBRILLATION (HCC): ICD-10-CM

## 2017-12-15 DIAGNOSIS — N18.6 ESRD (END STAGE RENAL DISEASE) ON DIALYSIS (HCC): ICD-10-CM

## 2017-12-15 PROCEDURE — 99213 OFFICE O/P EST LOW 20 MIN: CPT | Performed by: INTERNAL MEDICINE

## 2017-12-15 RX ORDER — CINACALCET 30 MG/1
30 TABLET, FILM COATED ORAL DAILY
COMMUNITY
End: 2019-11-12 | Stop reason: ALTCHOICE

## 2017-12-15 ASSESSMENT — ENCOUNTER SYMPTOMS
RESPIRATORY NEGATIVE: 1
EYES NEGATIVE: 1
EYE DISCHARGE: 0
ORTHOPNEA: 0
SHORTNESS OF BREATH: 0
BLURRED VISION: 0

## 2017-12-15 NOTE — PROGRESS NOTES
Swatara Georges GLASS  YOB: 1975    Date of Service:  12/15/2017    Chief Complaint   Patient presents with    Hypertension     3 mo chk up     3 month HTN checkup and follow up of ESRD. Doing well. No new complaints. Hypertension   This is a chronic problem. The current episode started more than 1 year ago. The problem is unchanged. The problem is controlled. Pertinent negatives include no anxiety, blurred vision, chest pain, headaches, malaise/fatigue, neck pain, orthopnea, palpitations, peripheral edema, PND, shortness of breath or sweats. There are no associated agents to hypertension. Risk factors for coronary artery disease include male gender. Past treatments include calcium channel blockers and angiotensin blockers. The current treatment provides significant improvement. There are no compliance problems. Hypertensive end-organ damage includes kidney disease. There is no history of angina, CAD/MI, CVA, heart failure, left ventricular hypertrophy, PVD or retinopathy. Identifiable causes of hypertension include chronic renal disease.        Allergies   Allergen Reactions    Pollen Extract      Outpatient Prescriptions Marked as Taking for the 12/15/17 encounter (Office Visit) with Lucho Rodriguez MD   Medication Sig Dispense Refill    cinacalcet (SENSIPAR) 30 MG tablet Take 30 mg by mouth daily      vitamin D (CHOLECALCIFEROL) 5000 units CAPS capsule Take 1 capsule by mouth daily 30 capsule 5    losartan (COZAAR) 50 MG tablet Take 1 tablet by mouth daily 30 tablet 5    diltiazem (CARDIZEM CD) 120 MG extended release capsule Take 1 capsule by mouth daily 30 capsule 3    aspirin EC 81 MG EC tablet Take 1 tablet by mouth daily 30 tablet 3    metoprolol succinate (TOPROL XL) 25 MG extended release tablet Take 1 tablet by mouth daily 30 tablet 5    RENVELA 800 MG tablet TK 2 TS PO TID WITH MEALS  6    HYDROcodone-acetaminophen (NORCO) 5-325 MG per tablet Take 2 tablets by mouth person, place, and time. He appears well-developed and well-nourished. No distress. HENT:   Head: Normocephalic and atraumatic. Eyes: Conjunctivae and EOM are normal. Pupils are equal, round, and reactive to light. Neck: Normal range of motion. Neck supple. Cardiovascular: Normal rate, regular rhythm and normal heart sounds. Exam reveals no gallop and no friction rub. No murmur heard. The patient has a regular rhythm today. Pulmonary/Chest: Effort normal and breath sounds normal.   Musculoskeletal: Normal range of motion. Neurological: He is alert and oriented to person, place, and time. Skin: Skin is warm and dry. He is not diaphoretic. Psychiatric: He has a normal mood and affect.  His behavior is normal. Judgment and thought content normal.       Lab Review   Admission on 08/27/2017, Discharged on 08/29/2017   Component Date Value    WBC 08/27/2017 5.7     RBC 08/27/2017 3.18*    Hemoglobin 08/27/2017 10.7*    Hematocrit 08/27/2017 32.4*    MCV 08/27/2017 101.9*    MCH 08/27/2017 33.8     MCHC 08/27/2017 33.2     RDW 08/27/2017 16.0*    Platelets 74/54/2818 236     MPV 08/27/2017 7.9     Neutrophils % 08/27/2017 53.4     Lymphocytes % 08/27/2017 29.7     Monocytes % 08/27/2017 12.4     Eosinophils % 08/27/2017 3.6     Basophils % 08/27/2017 0.9     Neutrophils # 08/27/2017 3.0     Lymphocytes # 08/27/2017 1.7     Monocytes # 08/27/2017 0.7     Eosinophils # 08/27/2017 0.2     Basophils # 08/27/2017 0.0     Sodium 08/27/2017 140     Potassium 08/27/2017 3.4*    Chloride 08/27/2017 95*    CO2 08/27/2017 27     Anion Gap 08/27/2017 18*    Glucose 08/27/2017 99     BUN 08/27/2017 45*    CREATININE 08/27/2017 11.1*    GFR Non- 08/27/2017 5*    GFR  08/27/2017 6*    Calcium 08/27/2017 9.8     Total Protein 08/27/2017 8.9*    Alb 08/27/2017 4.5     Albumin/Globulin Ratio 08/27/2017 1.0*    Total Bilirubin 08/27/2017 0.6     Alkaline Phosphatase 08/27/2017 98     ALT 08/27/2017 17     AST 08/27/2017 17     Globulin 08/27/2017 4.4     Magnesium 08/27/2017 2.30     Pro-BNP 08/27/2017 1033*    Troponin 08/27/2017 0.02*    Ventricular Rate 08/27/2017 100     Atrial Rate 08/27/2017 375     QRS Duration 08/27/2017 76     Q-T Interval 08/27/2017 328     QTc Calculation (Bazett) 08/27/2017 423     R Axis 08/27/2017 22     T Axis 08/27/2017 34     Diagnosis 08/27/2017                      Value:Atrial fibrillation  Abnormal ECG    Confirmed by CECE CHACKO, [address] (1986) on 8/27/2017 11:42:48 AM      TSH 08/27/2017 1.46     Troponin 08/27/2017 0.02*    Troponin 08/27/2017 0.02*   Orders Only on 06/16/2017   Component Date Value    HIV-1/HIV-2 Ab 06/19/2017 Non-reactive          Health Maintenance   Topic Date Due    Flu vaccine (1) 09/01/2017    Pneumococcal highest risk (2 of 3 - PPSV23) 12/27/2017    Lipid screen  02/15/2022    DTaP/Tdap/Td vaccine (2 - Td) 02/01/2027    HIV screen  Completed          Assessment/Plan:    HTN (hypertension)  Stable     ESRD (end stage renal disease) on dialysis (HCC)  No recent problems    Paroxysmal atrial fibrillation (HCC)  Regular rhythm today. 1. Essential hypertension      2. ESRD (end stage renal disease) on dialysis ([address]      3. Paroxysmal atrial fibrillation ([address]      4. Screening for colon cancer    - [facility] - Fara Garcia MD ([facility])       Return in about 3 months (around 3/15/2018).

## 2017-12-18 DIAGNOSIS — N18.30 CKD (CHRONIC KIDNEY DISEASE), STAGE III (HCC): ICD-10-CM

## 2017-12-18 DIAGNOSIS — I10 ESSENTIAL HYPERTENSION: ICD-10-CM

## 2017-12-18 NOTE — TELEPHONE ENCOUNTER
From: America Metz II  Sent: 12/18/2017 9:28 AM EST  Subject: Medication Renewal Request    America Metz II would like a refill of the following medications:  vitamin D (CHOLECALCIFEROL) 5000 units CAPS capsule Jesse Tavares MD]    Preferred pharmacy: [address], [facility]  [address] [id number] [address] [place] [address] [phone] - F [phone]    Comment:

## 2017-12-26 ENCOUNTER — HOSPITAL ENCOUNTER (OUTPATIENT)
Dept: ENDOSCOPY | Age: 42
Discharge: HOME | End: 2017-12-26
Attending: INTERNAL MEDICINE | Admitting: INTERNAL MEDICINE

## 2017-12-26 VITALS
WEIGHT: 170 LBS | DIASTOLIC BLOOD PRESSURE: 90 MMHG | TEMPERATURE: 98.2 F | BODY MASS INDEX: 25.76 KG/M2 | HEIGHT: 68 IN | SYSTOLIC BLOOD PRESSURE: 136 MMHG | HEART RATE: 74 BPM | OXYGEN SATURATION: 100 % | RESPIRATION RATE: 18 BRPM

## 2017-12-26 ASSESSMENT — PAIN - FUNCTIONAL ASSESSMENT: PAIN_FUNCTIONAL_ASSESSMENT: 0-10

## 2017-12-26 NOTE — PLAN OF CARE
after midnight, as ordered [x] Verify NPO status [x] IV fluids as support [x] Clear liquids and/or ice chips as ordered  [x] Tolerating clear liquids  [x] Special diet as ordered  [x] D/C IV fluids   ACTIVITY  [x] Assess level of function  [x]  Specified by physician  [x]  Activity as tolerated [x] Position on left side [x] Position on left side and reposition patient as physician ordered [x] Gradually elevate HOB to garcías position  [x] Position changes as patient tolerated  [x] Ambulate as pre-procedure   PATIENT / S.O. EDUCATION [x] Pre, Intra Post-procedure  teaching appropriate to procedure  [x] Conscious Sedation Teaching  [x] Pain Management - instructed [x] Encourage questions  [x] Clarify any concerns [x] Safety devices maintain to  prevent patient injury  [x] Assist and support patient  [x] Observe standard precautions [x] Physician confers with the family/S.O. [x] Short visit from family in RR area  [x] Physician specific post-procedure orders  [x] S/S complications with proper [x]F/U; office visits F/U  [x] Review discharge instructions, medicine to family /S. O. [x] Medication/ special diet information given to family/ S.O. [x]  Copy of discharge instructions givn to family/S.O.   OUTCOME PLANNING  DISCHARGE PLAN [x] Patient/S. O. will verbalize understanding of admission procedure & expectations of outcome in realistic terms   [x] Patient verbalize the role of family/S. O. in plan of care  [x] Patient will have designated responsible person available for discharge.  [x] Patient will demonstrate an  understanding of the planned  procedure and its related procedures and conscious sedation [x] Patient will:  - receive services according to the           standards of care  - receive standards for conscious       sedation  - remain free of injury [x] Patient will:  - have stable vital signs based on Anthony Score   - be pain free or tolerable, have no bleeding  - have minimal abdominal distention   -

## 2017-12-26 NOTE — H&P
History and Physical / Pre-Sedation Assessment    Patient:  Justin Bobby II   :   1975     Intended Procedure:  egd and colonoscopy    HPI: ESKD on dialysis. Pre renal transplant EGD and colonoscopy    Nurses notes reviewed and agreed. Medications reviewed  Allergies: Allergies   Allergen Reactions    Pollen Extract        Physical Exam:  Vital Signs: BP (!) 136/90   Pulse 74   Temp 98.2 °F (36.8 °C)   Resp 18   Ht 5' 8\" (1.727 m)   Wt 170 lb (77.1 kg)   SpO2 100%   BMI 25.85 kg/m²    Pulmonary:Normal  Cardiac:Normal  Abdomen:Normal    Pre-Procedure Assessment / Plan:  ASA 2 - Patient with mild systemic disease with no functional limitations  Level of Sedation Plan: Moderate sedation  Post Procedure plan: Return to same level of care    I assessed the patient and find that the patient is in satisfactory condition to proceed with the planned procedure and sedation plan. I have explained the risk, benefits, and alternatives to the procedure. The patient understands and agrees to proceed.   Yes    Gwendolyn Nichole  1:27 PM 2017

## 2017-12-27 NOTE — OP NOTE
[address]                  [address]                                 OPERATIVE REPORT    PATIENT NAME: Jude Burgos                    :        1975  MED REC NO:   [id number]                          ROOM:  ACCOUNT NO:   [de-identified]                          ADMIT DATE: 2017  PROVIDER:     Cyrus Alfaro MD    DATE OF PROCEDURE:  2017    INDICATION FOR PROCEDURE:  Pre renal transplant EGD and colonoscopy. EGD:  With the patient in the left lateral position and after sedation with  100 mcg of Fentanyl and 7 mg of Versed intravenously, the Olympus video  endoscope was introduced into the esophagus and advanced towards the GE  junction. Esophagus was normal.  Stomach was carefully inspected, it was  unremarkable. Biopsies were obtained for Helicobacter pylori. The  duodenum was normal.  Scope was then removed without complication. COLONOSCOPY:  The Olympus video colonoscope was inserted into the rectum  and advanced up to nearly 30-cm level. We encountered significant  difficulty negotiating that area. We have given the patient 1 mg of  glucagon; however, still we were unable to negotiate it. The scope was  then removed and a pediatric colonoscope was inserted and advanced up to  that level. We encountered the same difficulty. There appeared to be  significant spasm with sharp angulation. After we _____ attempts, we  withdrew the scope and procedure was terminated without complication. Up  to that level, no significant abnormalities were noted. IMPRESSION:  1. Normal upper endoscopy. 2.  Normal limited colonoscopy to 30 cm level. PLAN:  We will order barium enema and attempt colonoscopy again at some  point in the near future. Thank you Dr. Meghana Tamez.         Deepak Cadet MD    D: 2017 13:36:19       T: 2017 18:10:20     [email or web address]  Job#: [id number]     Doc#: [id number]    CC:  [facility] MD Alysha López MD

## 2018-01-19 ENCOUNTER — HOSPITAL ENCOUNTER (OUTPATIENT)
Dept: GENERAL RADIOLOGY | Age: 43
Discharge: HOME | End: 2018-01-19
Attending: INTERNAL MEDICINE | Admitting: INTERNAL MEDICINE

## 2018-01-19 DIAGNOSIS — Z98.890 HX OF COLONOSCOPY: ICD-10-CM

## 2018-01-24 DIAGNOSIS — I10 ESSENTIAL HYPERTENSION: ICD-10-CM

## 2018-01-24 DIAGNOSIS — N18.30 CKD (CHRONIC KIDNEY DISEASE), STAGE III (HCC): ICD-10-CM

## 2018-02-20 ENCOUNTER — HOSPITAL ENCOUNTER (OUTPATIENT)
Dept: ENDOSCOPY | Age: 43
Discharge: HOME | End: 2018-02-20
Attending: INTERNAL MEDICINE | Admitting: INTERNAL MEDICINE

## 2018-02-20 VITALS
WEIGHT: 170 LBS | RESPIRATION RATE: 18 BRPM | SYSTOLIC BLOOD PRESSURE: 153 MMHG | HEIGHT: 68 IN | OXYGEN SATURATION: 100 % | HEART RATE: 80 BPM | DIASTOLIC BLOOD PRESSURE: 97 MMHG | TEMPERATURE: 98.4 F | BODY MASS INDEX: 25.76 KG/M2

## 2018-02-20 ASSESSMENT — PAIN - FUNCTIONAL ASSESSMENT: PAIN_FUNCTIONAL_ASSESSMENT: 0-10

## 2018-02-20 ASSESSMENT — LIFESTYLE VARIABLES: SMOKING_STATUS: 0

## 2018-02-20 NOTE — ANESTHESIA POST-OP
Anesthesia Post-op Note    Patient: Micky Vasquez  MRN: [id number]  YOB: 1975  Date of evaluation: 2/20/2018  Time:  2:14 PM     Procedure(s) Performed: Colonoscopy    Last Vitals: BP (!) 153/97   Pulse 80   Temp 98.4 °F (36.9 °C)   Resp 18   Ht 5' 8\" (1.727 m)   Wt 170 lb (77.1 kg)   SpO2 100%   BMI 25.85 kg/m²     Anthony Phase I:      Anthony Phase II:      Anesthesia Post Evaluation    Final anesthesia type: MAC  Patient location during evaluation: procedure area  Level of consciousness: awake  Airway patency: patent  Nausea & Vomiting: no nausea and no vomiting  Complications: no  Cardiovascular status: hemodynamically stable  Respiratory status: acceptable  Hydration status: euvolemic        Dominga Steiner MD  2:14 PM

## 2018-02-20 NOTE — ANESTHESIA PRE-OP
Department of Anesthesiology  Preprocedure Note       Name:  Mervin Tena   Age:  38 y.o.  :  1975                                          MRN:  [id number]         Date:  2018      Surgeon:Yoan  Procedure:Colonoscopy, screening pre kidney transplant    Medications prior to admission:   Prior to Admission medications    Medication Sig Start Date End Date Taking? Authorizing Provider   vitamin D (CHOLECALCIFEROL) 5000 units CAPS capsule Take 1 capsule by mouth daily 18   Shefali Chung MD   cinacalcet (SENSIPAR) 30 MG tablet Take 30 mg by mouth daily    Historical Provider, MD   losartan (COZAAR) 50 MG tablet Take 1 tablet by mouth daily 10/26/17   Shefali Chung MD   diltiazem (CARDIZEM CD) 120 MG extended release capsule Take 1 capsule by mouth daily 17   Girish Bailey MD   aspirin EC 81 MG EC tablet Take 1 tablet by mouth daily 17   Fabian Grossman MD   metoprolol succinate (TOPROL XL) 25 MG extended release tablet Take 1 tablet by mouth daily 17   Shefali Chung MD   RENVELA 800 MG tablet TK 2 TS PO TID WITH MEALS 17   Historical Provider, MD   darbepoetin dalia-polysorbate (ARANESP) 200 MCG/0.4ML SOSY injection Infuse 0.4 mLs intravenously once a week On .  17   Mari Cisneros MD       Current medications:    Current Outpatient Prescriptions   Medication Sig Dispense Refill    vitamin D (CHOLECALCIFEROL) 5000 units CAPS capsule Take 1 capsule by mouth daily 30 capsule 2    cinacalcet (SENSIPAR) 30 MG tablet Take 30 mg by mouth daily      losartan (COZAAR) 50 MG tablet Take 1 tablet by mouth daily 30 tablet 5    diltiazem (CARDIZEM CD) 120 MG extended release capsule Take 1 capsule by mouth daily 30 capsule 3    aspirin EC 81 MG EC tablet Take 1 tablet by mouth daily 30 tablet 3    metoprolol succinate (TOPROL XL) 25 MG extended release tablet Take 1 tablet by mouth daily 30 tablet 5    RENVELA 800 MG tablet TK 2 TS PO TID

## 2018-02-20 NOTE — PLAN OF CARE
ADMISSION PRE-PROCEDURE INTRA-PROCEDURE POST-PROCEDURE: RECOVERY/ DISCHARGE   ASSESSMENT &  EVALUATION CONSULTS [x] Verify patient identification, allergies, vital signs, NPO, IV, & SPO2  [x] Complete the JOSE CARLOS SCORE  [x] Consent form to treat signed  [x] History and Physical [x] Reassess patient after pre- procedure medication given  [x] GI physician evaluates pt  [x] Verify patient's name, allergies [x] Continuous monitoring of vital  signs, SPO2, LOC  [x] Emotional status  [x] Patient comfort level [x] Total system admission assessment with appropriate intervention  [x] Pain evaluation and management  [x] Discharge criteria met  [x] Discharge assessment with appropriate intervention  [x] Compare with pre-procedure status  [x] Discharge by appropriate physician   DIAGNOSTIC / TESTS [x]  Lab work ordered  [x]  Obtain and attach lab work to patient's chart  [x]  Report abnormals and F/U with physician [x] Assure needed test results are present [x] Diagnostic testing as indicated  [x] Obtained specimens sent to lab [x] Diagnostic testing as indicated     MEDICATIONS [x]  Conscious sedation medications  explained to patient  [x]  Start IV per physician's order  and/or protocol  [x]  Verify compliance of the colon prep  []  Pre-procedure med. as ordered  [x] Verify compliance of colon prep. [x] Re-check IV access [x] Assist with administration of IV conscious sedation medication  [x] Start O2  per  nasal cannula, if needed   [x] IV fluids as indicated/ordered  [x] Administration of medications as ordered  [x] Medications as prescribed  [x] D/C O2 therapy as ordered   PROCEDURE/TREATMENT [x] Specific order by GI physician  [x] Specific procedure as scheduled  [x]  Verify procedure as ordered [x] Time out/procedure verification checklist complete.  [x] EGD/Colonoscopy and related procedures  [x] Assist physician with the procedure [x] Treatment as indicated   NUTRITION / DIET [x] NPO after midnight, as ordered [x]

## 2018-02-20 NOTE — H&P
History and Physical / Pre-Sedation Assessment    Patient:  Brittney Prado II   :   1975     Intended Procedure:  colonoscopy    HPI: pre transplant colonoscopy. Please see report. Diverticulosis  ESKD    Nurses notes reviewed and agreed. Medications reviewed  Allergies: Allergies   Allergen Reactions    Pollen Extract        Physical Exam:  Vital Signs: BP (!) 153/97   Pulse 80   Temp 98.4 °F (36.9 °C)   Resp 18   Ht 5' 8\" (1.727 m)   Wt 170 lb (77.1 kg)   SpO2 100%   BMI 25.85 kg/m²    Pulmonary:Normal  Cardiac:Normal  Abdomen:Normal    Pre-Procedure Assessment / Plan:  ASA 2 - Patient with mild systemic disease with no functional limitations  Level of Sedation Plan:Mac sedation  Post Procedure plan: Return to same level of care    I assessed the patient and find that the patient is in satisfactory condition to proceed with the planned procedure and sedation plan. I have explained the risk, benefits, and alternatives to the procedure. The patient understands and agrees to proceed.   Viktor Mix  12:55 PM 2018

## 2018-02-23 DIAGNOSIS — I10 ESSENTIAL HYPERTENSION: ICD-10-CM

## 2018-02-23 DIAGNOSIS — N18.30 CKD (CHRONIC KIDNEY DISEASE), STAGE III (HCC): ICD-10-CM

## 2018-03-06 DIAGNOSIS — I10 ESSENTIAL HYPERTENSION: ICD-10-CM

## 2018-03-06 RX ORDER — LOSARTAN POTASSIUM 50 MG/1
50 TABLET ORAL DAILY
Qty: 30 TABLET | Refills: 5 | Status: SHIPPED | OUTPATIENT
Start: 2018-03-06 | End: 2018-07-17 | Stop reason: SDUPTHER

## 2018-03-15 ENCOUNTER — OFFICE VISIT (OUTPATIENT)
Dept: PRIMARY CARE CLINIC | Age: 43
End: 2018-03-15

## 2018-03-15 VITALS
WEIGHT: 167.6 LBS | BODY MASS INDEX: 25.4 KG/M2 | TEMPERATURE: 99.1 F | OXYGEN SATURATION: 99 % | RESPIRATION RATE: 16 BRPM | DIASTOLIC BLOOD PRESSURE: 86 MMHG | HEART RATE: 63 BPM | HEIGHT: 68 IN | SYSTOLIC BLOOD PRESSURE: 135 MMHG

## 2018-03-15 DIAGNOSIS — I10 ESSENTIAL HYPERTENSION: ICD-10-CM

## 2018-03-15 DIAGNOSIS — N18.6 ESRD (END STAGE RENAL DISEASE) ON DIALYSIS (HCC): ICD-10-CM

## 2018-03-15 DIAGNOSIS — Z99.2 ESRD (END STAGE RENAL DISEASE) ON DIALYSIS (HCC): ICD-10-CM

## 2018-03-15 DIAGNOSIS — Z12.5 SCREENING FOR PROSTATE CANCER: Primary | ICD-10-CM

## 2018-03-15 PROCEDURE — 99213 OFFICE O/P EST LOW 20 MIN: CPT | Performed by: INTERNAL MEDICINE

## 2018-03-16 ASSESSMENT — ENCOUNTER SYMPTOMS
EYE REDNESS: 0
PHOTOPHOBIA: 0
GASTROINTESTINAL NEGATIVE: 1
EYE PAIN: 0
RESPIRATORY NEGATIVE: 1
EYE ITCHING: 0
EYES NEGATIVE: 1
EYE DISCHARGE: 0

## 2018-03-16 NOTE — PROGRESS NOTES
Rate 08/27/2017 100     Atrial Rate 08/27/2017 375     QRS Duration 08/27/2017 76     Q-T Interval 08/27/2017 328     QTc Calculation (Bazett) 08/27/2017 423     R Axis 08/27/2017 22     T Axis 08/27/2017 34     Diagnosis 08/27/2017                      Value:Atrial fibrillation  Abnormal ECG    Confirmed by CECE CHACKO, [address] ([id number]) on 8/27/2017 11:42:48 AM      TSH 08/27/2017 1.46     Troponin 08/27/2017 0.02*    Troponin 08/27/2017 0.02*         Health Maintenance   Topic Date Due    Flu vaccine (1) 09/01/2017    Pneumococcal highest risk (2 of 3 - PPSV23) 01/23/2018    Potassium monitoring  08/27/2018    Creatinine monitoring  08/27/2018    Lipid screen  02/15/2022    DTaP/Tdap/Td vaccine (2 - Td) 11/28/2027    HIV screen  Completed          Assessment/Plan:    HTN (hypertension)  Stable     ESRD (end stage renal disease) on dialysis [facility])  The patient him in to discuss a PSA test for screening for an upcoming renal transplant. He had no other information for me and stated that there was a form that I needed to fill out that he had left at home. The PSA was drawn and I will complete the form when it is either fax or brought in for me to complete      1. Screening for prostate cancer    - Psa screening; Future    2. Essential hypertension      3. ESRD (end stage renal disease) on dialysis [facility])         Return in about 4 months (around 7/15/2018).

## 2018-03-19 DIAGNOSIS — N18.30 CKD (CHRONIC KIDNEY DISEASE), STAGE III (HCC): ICD-10-CM

## 2018-03-19 DIAGNOSIS — I10 ESSENTIAL HYPERTENSION: ICD-10-CM

## 2018-03-22 ENCOUNTER — OFFICE VISIT (OUTPATIENT)
Dept: PRIMARY CARE CLINIC | Age: 43
End: 2018-03-22

## 2018-03-22 VITALS
TEMPERATURE: 97.9 F | WEIGHT: 169 LBS | DIASTOLIC BLOOD PRESSURE: 86 MMHG | OXYGEN SATURATION: 99 % | BODY MASS INDEX: 25.61 KG/M2 | HEART RATE: 68 BPM | SYSTOLIC BLOOD PRESSURE: 135 MMHG | HEIGHT: 68 IN

## 2018-03-22 DIAGNOSIS — N17.9 AKI (ACUTE KIDNEY INJURY) (HCC): Primary | ICD-10-CM

## 2018-03-22 PROCEDURE — 99213 OFFICE O/P EST LOW 20 MIN: CPT | Performed by: INTERNAL MEDICINE

## 2018-03-22 ASSESSMENT — ENCOUNTER SYMPTOMS
PHOTOPHOBIA: 0
EYES NEGATIVE: 1
RESPIRATORY NEGATIVE: 1
GASTROINTESTINAL NEGATIVE: 1
EYE ITCHING: 0
EYE DISCHARGE: 0
EYE PAIN: 0
EYE REDNESS: 0

## 2018-03-22 ASSESSMENT — PATIENT HEALTH QUESTIONNAIRE - PHQ9
2. FEELING DOWN, DEPRESSED OR HOPELESS: 0
SUM OF ALL RESPONSES TO PHQ9 QUESTIONS 1 & 2: 0
SUM OF ALL RESPONSES TO PHQ QUESTIONS 1-9: 0
1. LITTLE INTEREST OR PLEASURE IN DOING THINGS: 0

## 2018-03-22 NOTE — PROGRESS NOTES
Skin: Skin is warm and dry. Psychiatric: He has a normal mood and affect. His behavior is normal. Thought content normal.       Lab Review   Orders Only on 03/15/2018   Component Date Value    PSA 03/15/2018 1.57          Health Maintenance   Topic Date Due    Flu vaccine (1) 09/01/2017    Pneumococcal highest risk (2 of 3 - PPSV23) 01/23/2018    Potassium monitoring  08/27/2018    Creatinine monitoring  08/27/2018    Lipid screen  02/15/2022    DTaP/Tdap/Td vaccine (2 - Td) 11/28/2027    HIV screen  Completed          Assessment/Plan:    Transplant recipient  The patient came back in today for a digital rectal exam. This is part of his screening prior to receiving a transplanted kidney. The digital rectal exam was normal today. 1. Transplant recipient         No Follow-up on file.

## 2018-03-22 NOTE — ASSESSMENT & PLAN NOTE
The patient came back in today for a digital rectal exam. This is part of his screening prior to receiving a transplanted kidney. The digital rectal exam was normal today.

## 2018-03-26 DIAGNOSIS — I10 ESSENTIAL HYPERTENSION: ICD-10-CM

## 2018-03-26 DIAGNOSIS — N18.30 CKD (CHRONIC KIDNEY DISEASE), STAGE III (HCC): ICD-10-CM

## 2018-03-28 ENCOUNTER — TELEPHONE (OUTPATIENT)
Dept: SLEEP MEDICINE | Age: 43
End: 2018-03-28

## 2018-04-04 ENCOUNTER — TELEPHONE (OUTPATIENT)
Dept: CARDIOLOGY CLINIC | Age: 43
End: 2018-04-04

## 2018-04-18 ENCOUNTER — OFFICE VISIT (OUTPATIENT)
Dept: CARDIOLOGY CLINIC | Age: 43
End: 2018-04-18

## 2018-04-18 VITALS
OXYGEN SATURATION: 97 % | WEIGHT: 171 LBS | BODY MASS INDEX: 26 KG/M2 | DIASTOLIC BLOOD PRESSURE: 88 MMHG | HEART RATE: 64 BPM | SYSTOLIC BLOOD PRESSURE: 120 MMHG

## 2018-04-18 DIAGNOSIS — I10 ESSENTIAL HYPERTENSION: Primary | ICD-10-CM

## 2018-04-18 DIAGNOSIS — Z99.2 ESRD (END STAGE RENAL DISEASE) ON DIALYSIS (HCC): ICD-10-CM

## 2018-04-18 DIAGNOSIS — I48.0 PAROXYSMAL ATRIAL FIBRILLATION (HCC): ICD-10-CM

## 2018-04-18 DIAGNOSIS — N18.6 ESRD (END STAGE RENAL DISEASE) ON DIALYSIS (HCC): ICD-10-CM

## 2018-04-18 PROCEDURE — 99214 OFFICE O/P EST MOD 30 MIN: CPT | Performed by: NURSE PRACTITIONER

## 2018-04-19 DIAGNOSIS — N18.30 CKD (CHRONIC KIDNEY DISEASE), STAGE III (HCC): ICD-10-CM

## 2018-04-19 DIAGNOSIS — I10 ESSENTIAL HYPERTENSION: ICD-10-CM

## 2018-05-18 DIAGNOSIS — N18.30 CKD (CHRONIC KIDNEY DISEASE), STAGE III (HCC): ICD-10-CM

## 2018-05-18 DIAGNOSIS — I10 ESSENTIAL HYPERTENSION: ICD-10-CM

## 2018-05-18 RX ORDER — METOPROLOL SUCCINATE 25 MG/1
25 TABLET, EXTENDED RELEASE ORAL DAILY
Qty: 30 TABLET | Refills: 5 | Status: SHIPPED | OUTPATIENT
Start: 2018-05-18 | End: 2018-07-17

## 2018-06-05 ENCOUNTER — HOSPITAL ENCOUNTER (OUTPATIENT)
Dept: SLEEP MEDICINE | Age: 43
Discharge: HOME | End: 2018-06-07
Attending: NURSE PRACTITIONER | Admitting: NURSE PRACTITIONER

## 2018-06-05 PROCEDURE — 95806 SLEEP STUDY UNATT&RESP EFFT: CPT | Performed by: INTERNAL MEDICINE

## 2018-06-11 ENCOUNTER — TELEPHONE (OUTPATIENT)
Dept: SLEEP MEDICINE | Age: 43
End: 2018-06-11

## 2018-06-20 DIAGNOSIS — N18.30 CKD (CHRONIC KIDNEY DISEASE), STAGE III (HCC): ICD-10-CM

## 2018-06-20 DIAGNOSIS — I10 ESSENTIAL HYPERTENSION: ICD-10-CM

## 2018-06-22 ENCOUNTER — TELEPHONE (OUTPATIENT)
Dept: SLEEP MEDICINE | Age: 43
End: 2018-06-22

## 2018-06-24 ENCOUNTER — PATIENT MESSAGE (OUTPATIENT)
Dept: PRIMARY CARE CLINIC | Age: 43
End: 2018-06-24

## 2018-06-29 ENCOUNTER — HOSPITAL ENCOUNTER (OUTPATIENT)
Dept: OTHER | Age: 43
Discharge: HOME | End: 2018-06-29
Attending: UROLOGY | Admitting: UROLOGY

## 2018-06-29 LAB
AMPHETAMINE SCREEN, URINE: NORMAL
BARBITURATE SCREEN URINE: NORMAL
BENZODIAZEPINE SCREEN, URINE: NORMAL
CANNABINOID SCREEN URINE: NORMAL
COCAINE METABOLITE SCREEN URINE: NORMAL
Lab: NORMAL
METHADONE SCREEN, URINE: NORMAL
OPIATE SCREEN URINE: NORMAL
OXYCODONE URINE: NORMAL
PH UA: 7
PHENCYCLIDINE SCREEN URINE: NORMAL
PROPOXYPHENE SCREEN: NORMAL

## 2018-07-17 ENCOUNTER — OFFICE VISIT (OUTPATIENT)
Dept: PRIMARY CARE CLINIC | Age: 43
End: 2018-07-17

## 2018-07-17 VITALS
WEIGHT: 173 LBS | OXYGEN SATURATION: 98 % | SYSTOLIC BLOOD PRESSURE: 145 MMHG | DIASTOLIC BLOOD PRESSURE: 91 MMHG | HEIGHT: 68 IN | HEART RATE: 63 BPM | BODY MASS INDEX: 26.22 KG/M2 | TEMPERATURE: 97.3 F

## 2018-07-17 DIAGNOSIS — N18.30 CKD (CHRONIC KIDNEY DISEASE), STAGE III (HCC): ICD-10-CM

## 2018-07-17 DIAGNOSIS — Z99.2 ESRD (END STAGE RENAL DISEASE) ON DIALYSIS (HCC): ICD-10-CM

## 2018-07-17 DIAGNOSIS — I10 ESSENTIAL HYPERTENSION: ICD-10-CM

## 2018-07-17 DIAGNOSIS — N18.6 ESRD (END STAGE RENAL DISEASE) ON DIALYSIS (HCC): ICD-10-CM

## 2018-07-17 PROCEDURE — 99214 OFFICE O/P EST MOD 30 MIN: CPT | Performed by: INTERNAL MEDICINE

## 2018-07-17 RX ORDER — LOSARTAN POTASSIUM 50 MG/1
100 TABLET ORAL DAILY
Qty: 30 TABLET | Refills: 5 | Status: SHIPPED | OUTPATIENT
Start: 2018-07-17 | End: 2018-07-23 | Stop reason: DRUGHIGH

## 2018-07-17 RX ORDER — METOPROLOL SUCCINATE 50 MG/1
50 TABLET, EXTENDED RELEASE ORAL DAILY
COMMUNITY
End: 2018-10-23 | Stop reason: ALTCHOICE

## 2018-07-17 RX ORDER — METOPROLOL SUCCINATE 100 MG/1
100 TABLET, EXTENDED RELEASE ORAL DAILY
COMMUNITY
End: 2018-10-23 | Stop reason: ALTCHOICE

## 2018-07-17 ASSESSMENT — ENCOUNTER SYMPTOMS
EYE DISCHARGE: 0
EYES NEGATIVE: 1
RESPIRATORY NEGATIVE: 1
BLURRED VISION: 0

## 2018-07-17 NOTE — PROGRESS NOTES
capsule by mouth daily 30 capsule 3    aspirin EC 81 MG EC tablet Take 1 tablet by mouth daily 30 tablet 3    darbepoetin dalia-polysorbate (ARANESP) 200 MCG/0.4ML SOSY injection Infuse 0.4 mLs intravenously once a week On fridays. 1.68 mL 0       Immunization History   Administered Date(s) Administered    Hepatitis A 11/28/2017    Hepatitis B, unspecified formulation 03/01/2017, 03/29/2017, 04/28/2017, 08/25/2017    Pneumococcal 13-valent Conjugate (Cardona Moons) 11/01/2017, 11/28/2017    Tdap (Boostrix, Adacel) 02/01/2017, 11/28/2017       Past Medical History:   Diagnosis Date    CKD (chronic kidney disease), stage III 4/20/2012    HTN (hypertension) 4/20/2012     Past Surgical History:   Procedure Laterality Date    DIALYSIS FISTULA CREATION Right 41/46/7681    cephalic AV fistula     Family History   Problem Relation Age of Onset    High Blood Pressure Mother     High Blood Pressure Sister        Review of Systems:  Review of Systems   Constitutional: Negative for activity change, appetite change and malaise/fatigue. HENT: Negative. Eyes: Negative. Negative for blurred vision and discharge. Respiratory: Negative. Genitourinary: Negative for difficulty urinating. Musculoskeletal: Negative for arthralgias and neck pain. Skin: Negative for rash. Neurological: Negative. Psychiatric/Behavioral: Negative. Negative for agitation and confusion. The patient is not nervous/anxious. All other systems reviewed and are negative. Vitals:    07/17/18 0911 07/17/18 0914   BP: (!) 143/92 (!) 145/91   Pulse: 63    Temp: 97.3 °F (36.3 °C)    TempSrc: Oral    SpO2: 98%    Weight: 173 lb (78.5 kg)    Height: 5' 8\" (1.727 m)      Body mass index is 26.3 kg/m².      Wt Readings from Last 3 Encounters:   07/17/18 173 lb (78.5 kg)   06/20/18 170 lb (77.1 kg)   04/18/18 171 lb (77.6 kg)     BP Readings from Last 3 Encounters:   07/17/18 (!) 145/91   06/20/18 (!) 168/92   04/18/18 120/88 06/20/2018 101.6*    MCH 06/20/2018 34.7*    MCHC 06/20/2018 34.1     RDW 06/20/2018 15.1     Platelets 88/54/0978 162     MPV 06/20/2018 8.5     Neutrophils % 06/20/2018 59.2     Lymphocytes % 06/20/2018 23.7     Monocytes % 06/20/2018 14.0     Eosinophils % 06/20/2018 1.8     Basophils % 06/20/2018 1.3     Neutrophils # 06/20/2018 2.1     Lymphocytes # 06/20/2018 0.8*    Monocytes # 06/20/2018 0.5     Eosinophils # 06/20/2018 0.1     Basophils # 06/20/2018 0.0     Sodium 06/20/2018 140     Potassium reflex Magnesi* 06/20/2018 3.1*    Chloride 06/20/2018 94*    CO2 06/20/2018 36*    Anion Gap 06/20/2018 10     Glucose 06/20/2018 64*    BUN 06/20/2018 28*    CREATININE 06/20/2018 6.0*    GFR Non- 06/20/2018 10*    GFR  06/20/2018 12*    Calcium 06/20/2018 9.4     Total Protein 06/20/2018 8.1     Alb 06/20/2018 4.3     Albumin/Globulin Ratio 06/20/2018 1.1     Total Bilirubin 06/20/2018 0.4     Alkaline Phosphatase 06/20/2018 88     ALT 06/20/2018 27     AST 06/20/2018 21     Globulin 06/20/2018 3.8     Troponin 06/20/2018 0.04*    Protime 06/20/2018 12.0     INR 06/20/2018 1.05     Magnesium 06/20/2018 2.00    Orders Only on 03/15/2018   Component Date Value    PSA 03/15/2018 1.57          Health Maintenance   Topic Date Due    Flu vaccine (1) 09/01/2018    Potassium monitoring  06/20/2019    Creatinine monitoring  06/20/2019    Lipid screen  02/15/2022    Pneumococcal highest risk (3 of 3 - PPSV23) 05/24/2023    DTaP/Tdap/Td vaccine (2 - Td) 11/28/2027    HIV screen  Completed          Assessment/Plan:    HTN (hypertension)  The patient is here for blood pressure check. Blood pressure is elevated but he is being managed by the nephrologist at [facility]. I will leave management of his blood pressure to nephrology.     ESRD (end stage renal disease) on dialysis [facility])  The patient continues dialysis and follow-up with nephrology. 1. Essential hypertension    - losartan (COZAAR) 50 MG tablet; Take 2 tablets by mouth daily  Dispense: 30 tablet; Refill: 5  - vitamin D (CHOLECALCIFEROL) 5000 units CAPS capsule; Take 1 capsule by mouth daily  Dispense: 30 capsule; Refill: 2    2. CKD (chronic kidney disease), stage III    - vitamin D (CHOLECALCIFEROL) 5000 units CAPS capsule; Take 1 capsule by mouth daily  Dispense: 30 capsule; Refill: 2    3. ESRD (end stage renal disease) on dialysis [facility])         Return in about 3 months (around 10/17/2018).

## 2018-07-23 RX ORDER — DOXAZOSIN 2 MG/1
2 TABLET ORAL 2 TIMES DAILY
Qty: 60 TABLET | Refills: 5 | Status: SHIPPED | OUTPATIENT
Start: 2018-07-23 | End: 2018-12-12 | Stop reason: SDUPTHER

## 2018-07-23 RX ORDER — LOSARTAN POTASSIUM 100 MG/1
100 TABLET ORAL DAILY
Qty: 30 TABLET | Refills: 11 | Status: SHIPPED | OUTPATIENT
Start: 2018-07-23 | End: 2018-12-12 | Stop reason: SDUPTHER

## 2018-08-21 DIAGNOSIS — I10 ESSENTIAL HYPERTENSION: ICD-10-CM

## 2018-08-21 DIAGNOSIS — N18.30 CKD (CHRONIC KIDNEY DISEASE), STAGE III (HCC): ICD-10-CM

## 2018-08-21 RX ORDER — DOXAZOSIN 2 MG/1
2 TABLET ORAL 2 TIMES DAILY
Qty: 60 TABLET | Refills: 5 | Status: CANCELLED | OUTPATIENT
Start: 2018-08-21

## 2018-08-21 NOTE — TELEPHONE ENCOUNTER
From: Aretha Allen II  Sent: 8/20/2018 1:11 PM EDT  Subject: Medication Renewal Request    Aretha Allen II would like a refill of the following medications:     vitamin D (CHOLECALCIFEROL) 5000 units CAPS capsule Leonides Peacock MD]     doxazosin (CARDURA) 2 MG tablet Leonides Peacock MD]    Preferred pharmacy: [address], [facility]  49.[address] [facility] [address] [place] [address] [phone] [facility] [phone]    Comment:

## 2018-09-04 ENCOUNTER — OFFICE VISIT (OUTPATIENT)
Dept: SLEEP MEDICINE | Age: 43
End: 2018-09-04

## 2018-09-04 VITALS
DIASTOLIC BLOOD PRESSURE: 70 MMHG | BODY MASS INDEX: 25.97 KG/M2 | OXYGEN SATURATION: 98 % | HEART RATE: 72 BPM | WEIGHT: 170.8 LBS | SYSTOLIC BLOOD PRESSURE: 120 MMHG

## 2018-09-04 DIAGNOSIS — I48.0 PAROXYSMAL ATRIAL FIBRILLATION (HCC): ICD-10-CM

## 2018-09-04 DIAGNOSIS — I10 ESSENTIAL HYPERTENSION: Chronic | ICD-10-CM

## 2018-09-04 DIAGNOSIS — G47.33 OBSTRUCTIVE SLEEP APNEA SYNDROME: ICD-10-CM

## 2018-09-04 PROCEDURE — 99213 OFFICE O/P EST LOW 20 MIN: CPT | Performed by: INTERNAL MEDICINE

## 2018-09-04 ASSESSMENT — SLEEP AND FATIGUE QUESTIONNAIRES
HOW LIKELY ARE YOU TO NOD OFF OR FALL ASLEEP WHILE SITTING AND READING: 1
HOW LIKELY ARE YOU TO NOD OFF OR FALL ASLEEP WHILE SITTING INACTIVE IN A PUBLIC PLACE: 1
HOW LIKELY ARE YOU TO NOD OFF OR FALL ASLEEP WHILE SITTING QUIETLY AFTER LUNCH WITHOUT ALCOHOL: 0
HOW LIKELY ARE YOU TO NOD OFF OR FALL ASLEEP WHEN YOU ARE A PASSENGER IN A CAR FOR AN HOUR WITHOUT A BREAK: 1
ESS TOTAL SCORE: 5
HOW LIKELY ARE YOU TO NOD OFF OR FALL ASLEEP WHILE SITTING AND TALKING TO SOMEONE: 0
HOW LIKELY ARE YOU TO NOD OFF OR FALL ASLEEP WHILE WATCHING TV: 1
HOW LIKELY ARE YOU TO NOD OFF OR FALL ASLEEP WHILE LYING DOWN TO REST IN THE AFTERNOON WHEN CIRCUMSTANCES PERMIT: 1
HOW LIKELY ARE YOU TO NOD OFF OR FALL ASLEEP IN A CAR, WHILE STOPPED FOR A FEW MINUTES IN TRAFFIC: 0

## 2018-09-04 ASSESSMENT — ENCOUNTER SYMPTOMS
PHOTOPHOBIA: 0
CHEST TIGHTNESS: 0
EYE PAIN: 0
SINUS PRESSURE: 0
STRIDOR: 0
SHORTNESS OF BREATH: 0

## 2018-09-04 NOTE — LETTER
[facility] Sleep [facility]  [address]  [facility] [place] [address] [address]  Phone: [phone]  Fax: [phone]    September 4, 2018       Patient: Aman Mccall II   MR Number: [id number]   YOB: 1975   Date of Visit: 9/4/2018       Aman Mccall II was seen for a follow up visit today. Here is my assessment and plan as well as an attached copy of his visit today:    Obstructive sleep apnea syndrome  New Problem - On Tx. Reviewed sleep study (copy given to pt for their records) and download compliance data with patient. Supplies and parts as needed for his machine. These are medically necessary. Limit caffeine use after 3pm.  Continue medications per his PCP and other physicians. He instructed not to drive unless had 4 hrs of effective therapy for his STEVE the night before. Did review the risks of under or untreated STEVE including, but not limited to, higher risks of motor vehicle accidents, stroke, heart attacks, and death. He understands and accepts all these risks. Needs to use his machine each night, every night. Paroxysmal atrial fibrillation (HCC)  Chronic- Stable. Cont meds per PCP and other physicians. HTN (hypertension)  Chronic- Stable. Cont meds per PCP and other physicians. If you have questions or concerns, please do not hesitate to call me. I look forward to following Genaro Hood along with you.     Sincerely,    Darya Hunter MD    CC providers:  Allie Rodriguez, [facility]

## 2018-09-18 DIAGNOSIS — N18.30 CKD (CHRONIC KIDNEY DISEASE), STAGE III (HCC): ICD-10-CM

## 2018-09-18 DIAGNOSIS — I10 ESSENTIAL HYPERTENSION: ICD-10-CM

## 2018-10-17 ENCOUNTER — OFFICE VISIT (OUTPATIENT)
Dept: PRIMARY CARE CLINIC | Age: 43
End: 2018-10-17
Payer: COMMERCIAL

## 2018-10-17 VITALS
OXYGEN SATURATION: 97 % | TEMPERATURE: 97.4 F | HEIGHT: 68 IN | DIASTOLIC BLOOD PRESSURE: 78 MMHG | HEART RATE: 63 BPM | WEIGHT: 173.4 LBS | SYSTOLIC BLOOD PRESSURE: 127 MMHG | BODY MASS INDEX: 26.28 KG/M2

## 2018-10-17 DIAGNOSIS — G47.33 OBSTRUCTIVE SLEEP APNEA SYNDROME: ICD-10-CM

## 2018-10-17 DIAGNOSIS — N18.6 ESRD (END STAGE RENAL DISEASE) ON DIALYSIS (HCC): ICD-10-CM

## 2018-10-17 DIAGNOSIS — I10 ESSENTIAL HYPERTENSION: Chronic | ICD-10-CM

## 2018-10-17 DIAGNOSIS — I10 ESSENTIAL HYPERTENSION: ICD-10-CM

## 2018-10-17 DIAGNOSIS — N18.30 CKD (CHRONIC KIDNEY DISEASE), STAGE III (HCC): ICD-10-CM

## 2018-10-17 DIAGNOSIS — Z99.2 ESRD (END STAGE RENAL DISEASE) ON DIALYSIS (HCC): ICD-10-CM

## 2018-10-17 PROCEDURE — 99213 OFFICE O/P EST LOW 20 MIN: CPT | Performed by: INTERNAL MEDICINE

## 2018-10-17 RX ORDER — CARVEDILOL 25 MG/1
25 TABLET ORAL 2 TIMES DAILY WITH MEALS
COMMUNITY
End: 2019-06-21 | Stop reason: SDUPTHER

## 2018-10-17 ASSESSMENT — ENCOUNTER SYMPTOMS
RESPIRATORY NEGATIVE: 1
EYE DISCHARGE: 0
EYES NEGATIVE: 1

## 2018-10-17 NOTE — PROGRESS NOTES
Prudence Din II  YOB: 1975    Date of Service:  10/17/2018    Chief Complaint   Patient presents with    Hypertension     Doing well . Near the top of the list for transplant in [facility]. Also on the [place] list.     Hypertension   This is a chronic problem. The current episode started more than 1 year ago. The problem is unchanged. The problem is controlled. There are no associated agents to hypertension. Risk factors for coronary artery disease include male gender. The current treatment provides significant improvement. There are no compliance problems. Allergies   Allergen Reactions    Pollen Extract      Outpatient Prescriptions Marked as Taking for the 10/17/18 encounter (Office Visit) with Ernesto Mcclelland MD   Medication Sig Dispense Refill    carvedilol (COREG) 25 MG tablet Take 25 mg by mouth 2 times daily (with meals)      vitamin D (CHOLECALCIFEROL) 5000 units CAPS capsule Take 1 capsule by mouth daily 30 capsule 2    losartan (COZAAR) 100 MG tablet Take 1 tablet by mouth daily 30 tablet 11    doxazosin (CARDURA) 2 MG tablet Take 1 tablet by mouth 2 times daily 60 tablet 5    metoprolol succinate (TOPROL XL) 100 MG extended release tablet Take 100 mg by mouth daily Take one daily      Sucroferric Oxyhydroxide (VELPHORO) 500 MG CHEW Take 3 each by mouth 3 times daily      cinacalcet (SENSIPAR) 30 MG tablet Take 30 mg by mouth daily      diltiazem (CARDIZEM CD) 120 MG extended release capsule Take 1 capsule by mouth daily 30 capsule 3    aspirin EC 81 MG EC tablet Take 1 tablet by mouth daily 30 tablet 3    darbepoetin dalia-polysorbate (ARANESP) 200 MCG/0.4ML SOSY injection Infuse 0.4 mLs intravenously once a week On fridays.  1.68 mL 0       Immunization History   Administered Date(s) Administered    Hepatitis A 11/28/2017    Hepatitis B, unspecified formulation 03/01/2017, 03/29/2017, 04/28/2017, 08/25/2017    Influenza Virus Vaccine 10/12/2018   

## 2018-10-17 NOTE — TELEPHONE ENCOUNTER
Requested Prescriptions     Pending Prescriptions Disp Refills    vitamin D (CHOLECALCIFEROL) 5000 units CAPS capsule 30 capsule 2     Sig: Take 1 capsule by mouth daily     Last Fill Date:  9/18/18  R:2  Last OV:7/17/2018

## 2018-10-22 NOTE — PROGRESS NOTES
Sucroferric Oxyhydroxide (VELPHORO) 500 MG CHEW Take 3 each by mouth 3 times daily      cinacalcet (SENSIPAR) 30 MG tablet Take 30 mg by mouth daily      diltiazem (CARDIZEM CD) 120 MG extended release capsule Take 1 capsule by mouth daily 30 capsule 3    aspirin EC 81 MG EC tablet Take 1 tablet by mouth daily 30 tablet 3    darbepoetin dalia-polysorbate (ARANESP) 200 MCG/0.4ML SOSY injection Infuse 0.4 mLs intravenously once a week On fridays. 1.68 mL 0     No current facility-administered medications for this visit. REVIEW OF SYSTEMS:   CONSTITUTIONAL: No major weight gain or loss, fatigue, weakness, night sweats or fever. There's been no change in energy level, sleep pattern, or activity level. HEENT: No new vision difficulties or ringing in the ears. RESPIRATORY: No new SOB, PND, orthopnea or cough. CARDIOVASCULAR: See HPI  GI: No nausea, vomiting, diarrhea, constipation, abdominal pain or changes in bowel habits. : No urinary frequency, urgency, incontinence hematuria or dysuria. SKIN: No cyanosis or skin lesions. MUSCULOSKELETAL: No new muscle or joint pain. NEUROLOGICAL: No syncope or TIA-like symptoms. PSYCHIATRIC: No anxiety, pain, insomnia or depression    Objective:   PHYSICAL EXAM:        VITALS:  BP (!) 140/86 (Site: Left Upper Arm, Position: Sitting)   Pulse 72   Ht 5' 8\" (1.727 m)   Wt 174 lb 6.4 oz (79.1 kg)   SpO2 98%   BMI 26.52 kg/m²     CONSTITUTIONAL: Cooperative, no apparent distress, and appears well nourished / developed  NEUROLOGIC:  Awake and orientated to person, place and time. PSYCH: Calm affect, appropriate demeanor and mood  SKIN: Warm and dry, appropriate skin pigmentation. HEENT: Sclera non-icteric, normocephalic, neck supple, no elevation of JVP, normal carotid pulses with no bruits and thyroid normal size. LUNGS:  No increased work of breathing and clear to auscultation, no crackles or wheezing.   CARDIOVASCULAR:  Regular rate and rhythm with no

## 2018-10-23 ENCOUNTER — OFFICE VISIT (OUTPATIENT)
Dept: CARDIOLOGY CLINIC | Age: 43
End: 2018-10-23
Payer: COMMERCIAL

## 2018-10-23 VITALS
OXYGEN SATURATION: 98 % | HEART RATE: 72 BPM | WEIGHT: 174.4 LBS | SYSTOLIC BLOOD PRESSURE: 140 MMHG | BODY MASS INDEX: 26.43 KG/M2 | HEIGHT: 68 IN | DIASTOLIC BLOOD PRESSURE: 86 MMHG

## 2018-10-23 DIAGNOSIS — I48.0 PAROXYSMAL ATRIAL FIBRILLATION (HCC): Chronic | ICD-10-CM

## 2018-10-23 DIAGNOSIS — Z99.2 ESRD (END STAGE RENAL DISEASE) ON DIALYSIS (HCC): ICD-10-CM

## 2018-10-23 DIAGNOSIS — N18.6 ESRD (END STAGE RENAL DISEASE) ON DIALYSIS (HCC): ICD-10-CM

## 2018-10-23 DIAGNOSIS — I10 ESSENTIAL HYPERTENSION: Primary | Chronic | ICD-10-CM

## 2018-10-23 DIAGNOSIS — G47.33 OBSTRUCTIVE SLEEP APNEA SYNDROME: ICD-10-CM

## 2018-10-23 PROCEDURE — 99214 OFFICE O/P EST MOD 30 MIN: CPT | Performed by: NURSE PRACTITIONER

## 2018-11-19 ENCOUNTER — OFFICE VISIT (OUTPATIENT)
Dept: PULMONOLOGY | Age: 43
End: 2018-11-19
Payer: COMMERCIAL

## 2018-11-19 VITALS
OXYGEN SATURATION: 98 % | DIASTOLIC BLOOD PRESSURE: 98 MMHG | WEIGHT: 176 LBS | BODY MASS INDEX: 26.67 KG/M2 | SYSTOLIC BLOOD PRESSURE: 158 MMHG | HEIGHT: 68 IN | HEART RATE: 110 BPM

## 2018-11-19 DIAGNOSIS — I10 ESSENTIAL HYPERTENSION: Chronic | ICD-10-CM

## 2018-11-19 DIAGNOSIS — I48.0 PAROXYSMAL ATRIAL FIBRILLATION (HCC): Chronic | ICD-10-CM

## 2018-11-19 DIAGNOSIS — G47.33 OBSTRUCTIVE SLEEP APNEA SYNDROME: Primary | ICD-10-CM

## 2018-11-19 PROCEDURE — 99214 OFFICE O/P EST MOD 30 MIN: CPT | Performed by: NURSE PRACTITIONER

## 2018-11-19 ASSESSMENT — SLEEP AND FATIGUE QUESTIONNAIRES
HOW LIKELY ARE YOU TO NOD OFF OR FALL ASLEEP WHILE SITTING QUIETLY AFTER LUNCH WITHOUT ALCOHOL: 0
HOW LIKELY ARE YOU TO NOD OFF OR FALL ASLEEP WHILE SITTING AND TALKING TO SOMEONE: 0
ESS TOTAL SCORE: 4
HOW LIKELY ARE YOU TO NOD OFF OR FALL ASLEEP WHILE SITTING AND READING: 1
HOW LIKELY ARE YOU TO NOD OFF OR FALL ASLEEP WHILE WATCHING TV: 1
HOW LIKELY ARE YOU TO NOD OFF OR FALL ASLEEP WHEN YOU ARE A PASSENGER IN A CAR FOR AN HOUR WITHOUT A BREAK: 1
HOW LIKELY ARE YOU TO NOD OFF OR FALL ASLEEP WHILE LYING DOWN TO REST IN THE AFTERNOON WHEN CIRCUMSTANCES PERMIT: 1
HOW LIKELY ARE YOU TO NOD OFF OR FALL ASLEEP IN A CAR, WHILE STOPPED FOR A FEW MINUTES IN TRAFFIC: 0
HOW LIKELY ARE YOU TO NOD OFF OR FALL ASLEEP WHILE SITTING INACTIVE IN A PUBLIC PLACE: 0

## 2018-11-19 ASSESSMENT — ENCOUNTER SYMPTOMS
EYE REDNESS: 0
EYE PAIN: 0
APNEA: 0
COUGH: 0
ABDOMINAL DISTENTION: 0
ABDOMINAL PAIN: 0
RHINORRHEA: 0
SINUS PRESSURE: 0
SHORTNESS OF BREATH: 0

## 2018-12-12 RX ORDER — LOSARTAN POTASSIUM 100 MG/1
100 TABLET ORAL DAILY
Qty: 90 TABLET | Refills: 11 | Status: SHIPPED | OUTPATIENT
Start: 2018-12-12 | End: 2019-06-21 | Stop reason: SDUPTHER

## 2018-12-12 RX ORDER — LOSARTAN POTASSIUM 100 MG/1
100 TABLET ORAL DAILY
Qty: 30 TABLET | Refills: 11 | Status: SHIPPED | OUTPATIENT
Start: 2018-12-12 | End: 2018-12-12 | Stop reason: SDUPTHER

## 2018-12-12 RX ORDER — DOXAZOSIN 2 MG/1
2 TABLET ORAL 2 TIMES DAILY
Qty: 60 TABLET | Refills: 5 | Status: SHIPPED | OUTPATIENT
Start: 2018-12-12 | End: 2018-12-12 | Stop reason: SDUPTHER

## 2018-12-12 RX ORDER — DOXAZOSIN 2 MG/1
TABLET ORAL
Qty: 180 TABLET | Refills: 5 | Status: SHIPPED | OUTPATIENT
Start: 2018-12-12 | End: 2019-06-21 | Stop reason: SDUPTHER

## 2018-12-12 NOTE — TELEPHONE ENCOUNTER
From: Jaleel Moser II  Sent: 12/12/2018 10:49 AM EST  Subject: Medication Renewal Request    Jaleel Moser II would like a refill of the following medications:     losartan (COZAAR) 100 MG tablet Amadou Morales MD]     doxazosin (CARDURA) 2 MG tablet Amadou Morales MD]    Preferred pharmacy: [address] [phone] Sharon Harris [phone]    Comment:

## 2018-12-12 NOTE — TELEPHONE ENCOUNTER
Requested Prescriptions     Pending Prescriptions Disp Refills    losartan (COZAAR) 100 MG tablet [Pharmacy Med Name: LOSARTAN 100MG TABLETS] 90 tablet 11     Sig: TAKE 1 TABLET BY MOUTH DAILY     7/17/18

## 2018-12-18 ENCOUNTER — HOSPITAL ENCOUNTER (OUTPATIENT)
Dept: SLEEP CENTER | Age: 43
Discharge: HOME | End: 2018-12-18
Payer: COMMERCIAL

## 2018-12-18 DIAGNOSIS — I48.0 PAROXYSMAL ATRIAL FIBRILLATION (HCC): Chronic | ICD-10-CM

## 2018-12-18 DIAGNOSIS — G47.33 OBSTRUCTIVE SLEEP APNEA SYNDROME: ICD-10-CM

## 2018-12-18 DIAGNOSIS — I10 ESSENTIAL HYPERTENSION: Chronic | ICD-10-CM

## 2018-12-18 PROCEDURE — 95811 POLYSOM 6/>YRS CPAP 4/> PARM: CPT

## 2019-01-03 PROCEDURE — 95811 POLYSOM 6/>YRS CPAP 4/> PARM: CPT | Performed by: INTERNAL MEDICINE

## 2019-01-07 ENCOUNTER — TELEPHONE (OUTPATIENT)
Dept: PULMONOLOGY | Age: 44
End: 2019-01-07

## 2019-01-16 LAB
LEFT VENTRICULAR EJECTION FRACTION HIGH VALUE: 65 %
LEFT VENTRICULAR EJECTION FRACTION MODE: NORMAL

## 2019-01-17 ENCOUNTER — HOSPITAL ENCOUNTER (OUTPATIENT)
Dept: NON INVASIVE DIAGNOSTICS | Age: 44
Discharge: HOME | End: 2019-01-17
Payer: COMMERCIAL

## 2019-01-17 LAB
LV EF: 61 %
LV EF: 65 %
LVEF MODALITY: NORMAL
LVEF MODALITY: NORMAL

## 2019-01-17 PROCEDURE — 3430000000 HC RX DIAGNOSTIC RADIOPHARMACEUTICAL: Performed by: UROLOGY

## 2019-01-17 PROCEDURE — 93306 TTE W/DOPPLER COMPLETE: CPT

## 2019-01-17 PROCEDURE — A9502 TC99M TETROFOSMIN: HCPCS | Performed by: UROLOGY

## 2019-01-17 PROCEDURE — 78452 HT MUSCLE IMAGE SPECT MULT: CPT

## 2019-01-17 PROCEDURE — 93017 CV STRESS TEST TRACING ONLY: CPT | Performed by: INTERNAL MEDICINE

## 2019-01-17 RX ADMIN — TETROFOSMIN 30 MILLICURIE: 0.23 INJECTION, POWDER, LYOPHILIZED, FOR SOLUTION INTRAVENOUS at 10:22

## 2019-01-17 RX ADMIN — TETROFOSMIN 10 MILLICURIE: 0.23 INJECTION, POWDER, LYOPHILIZED, FOR SOLUTION INTRAVENOUS at 08:45

## 2019-01-22 ENCOUNTER — OFFICE VISIT (OUTPATIENT)
Dept: PRIMARY CARE CLINIC | Age: 44
End: 2019-01-22
Payer: COMMERCIAL

## 2019-01-22 VITALS
TEMPERATURE: 98 F | RESPIRATION RATE: 18 BRPM | HEART RATE: 80 BPM | DIASTOLIC BLOOD PRESSURE: 90 MMHG | BODY MASS INDEX: 26.3 KG/M2 | SYSTOLIC BLOOD PRESSURE: 142 MMHG | WEIGHT: 173 LBS | OXYGEN SATURATION: 98 %

## 2019-01-22 DIAGNOSIS — I10 ESSENTIAL HYPERTENSION: Chronic | ICD-10-CM

## 2019-01-22 DIAGNOSIS — N18.6 ESRD (END STAGE RENAL DISEASE) ON DIALYSIS (HCC): ICD-10-CM

## 2019-01-22 DIAGNOSIS — Z99.2 ESRD (END STAGE RENAL DISEASE) ON DIALYSIS (HCC): ICD-10-CM

## 2019-01-22 DIAGNOSIS — I48.0 PAROXYSMAL ATRIAL FIBRILLATION (HCC): Chronic | ICD-10-CM

## 2019-01-22 PROCEDURE — 99213 OFFICE O/P EST LOW 20 MIN: CPT | Performed by: INTERNAL MEDICINE

## 2019-01-22 ASSESSMENT — ENCOUNTER SYMPTOMS
EYES NEGATIVE: 1
EYE DISCHARGE: 0
RESPIRATORY NEGATIVE: 1

## 2019-04-16 ENCOUNTER — OFFICE VISIT (OUTPATIENT)
Dept: PULMONOLOGY | Age: 44
End: 2019-04-16
Payer: COMMERCIAL

## 2019-04-16 VITALS
HEIGHT: 68 IN | WEIGHT: 173 LBS | OXYGEN SATURATION: 98 % | HEART RATE: 68 BPM | SYSTOLIC BLOOD PRESSURE: 118 MMHG | BODY MASS INDEX: 26.22 KG/M2 | DIASTOLIC BLOOD PRESSURE: 70 MMHG

## 2019-04-16 DIAGNOSIS — I10 ESSENTIAL HYPERTENSION: Chronic | ICD-10-CM

## 2019-04-16 DIAGNOSIS — G47.33 OBSTRUCTIVE SLEEP APNEA SYNDROME: Primary | ICD-10-CM

## 2019-04-16 DIAGNOSIS — I48.0 PAROXYSMAL ATRIAL FIBRILLATION (HCC): Chronic | ICD-10-CM

## 2019-04-16 PROCEDURE — G8427 DOCREV CUR MEDS BY ELIG CLIN: HCPCS | Performed by: NURSE PRACTITIONER

## 2019-04-16 PROCEDURE — 99213 OFFICE O/P EST LOW 20 MIN: CPT | Performed by: NURSE PRACTITIONER

## 2019-04-16 PROCEDURE — G8419 CALC BMI OUT NRM PARAM NOF/U: HCPCS | Performed by: NURSE PRACTITIONER

## 2019-04-16 PROCEDURE — 1036F TOBACCO NON-USER: CPT | Performed by: NURSE PRACTITIONER

## 2019-04-16 ASSESSMENT — ENCOUNTER SYMPTOMS
EYE REDNESS: 0
COUGH: 0
RHINORRHEA: 0
SINUS PRESSURE: 0
EYE PAIN: 0
APNEA: 0
ABDOMINAL PAIN: 0
SHORTNESS OF BREATH: 0
ABDOMINAL DISTENTION: 0

## 2019-04-16 ASSESSMENT — SLEEP AND FATIGUE QUESTIONNAIRES
HOW LIKELY ARE YOU TO NOD OFF OR FALL ASLEEP WHILE SITTING QUIETLY AFTER LUNCH WITHOUT ALCOHOL: 0
HOW LIKELY ARE YOU TO NOD OFF OR FALL ASLEEP WHILE SITTING AND READING: 1
HOW LIKELY ARE YOU TO NOD OFF OR FALL ASLEEP WHEN YOU ARE A PASSENGER IN A CAR FOR AN HOUR WITHOUT A BREAK: 0
HOW LIKELY ARE YOU TO NOD OFF OR FALL ASLEEP WHILE SITTING AND TALKING TO SOMEONE: 0
HOW LIKELY ARE YOU TO NOD OFF OR FALL ASLEEP WHILE WATCHING TV: 1
HOW LIKELY ARE YOU TO NOD OFF OR FALL ASLEEP IN A CAR, WHILE STOPPED FOR A FEW MINUTES IN TRAFFIC: 0
HOW LIKELY ARE YOU TO NOD OFF OR FALL ASLEEP WHILE LYING DOWN TO REST IN THE AFTERNOON WHEN CIRCUMSTANCES PERMIT: 1
HOW LIKELY ARE YOU TO NOD OFF OR FALL ASLEEP WHILE SITTING INACTIVE IN A PUBLIC PLACE: 0
ESS TOTAL SCORE: 3

## 2019-04-16 NOTE — ASSESSMENT & PLAN NOTE
Chronic needing further treatment     Reviewed compliance download with pt. Supplies and parts as needed for his machine. These are medically necessary. Continue medications per his PCP and other physicians. Limit caffeine use after 3pm.  Encouraged him to work on weight loss through diet and exercise. Diagnoses of Paroxysmal atrial fibrillation (HCC) and Essential hypertension were pertinent to this visit. The chronic medical conditions listed are directly related to the primary diagnosis listed above. The management of the primary diagnosis affects the secondary diagnosis and vice versa.

## 2019-04-16 NOTE — LETTER
[facility] Sleep [facility]  [address] [place] [id number]  Phone: [phone]  Fax: [phone]    April 16, 2019       Patient: Yina Pacheco II   MR Number: [id number]   YOB: 1975   Date of Visit: 4/16/2019       Yina Pacheco II was seen for a follow up visit today. Here is my assessment and plan as well as an attached copy of his visit today:    Paroxysmal atrial fibrillation ([address]  Chronic- Stable. Cont meds per PCP and other physicians. HTN (hypertension)  Chronic- Stable. Cont meds per PCP and other physicians. Obstructive sleep apnea syndrome  Chronic needing further treatment     Reviewed compliance download with pt. Supplies and parts as needed for his machine. These are medically necessary. Continue medications per his PCP and other physicians. Limit caffeine use after 3pm.  Encouraged him to work on weight loss through diet and exercise. Diagnoses of Paroxysmal atrial fibrillation (HCC) and Essential hypertension were pertinent to this visit. The chronic medical conditions listed are directly related to the primary diagnosis listed above. The management of the primary diagnosis affects the secondary diagnosis and vice versa. If you have questions or concerns, please do not hesitate to call me. I look forward to following Brenna Hercules along with you.     Sincerely,    Darryle Cluster, APRN - CNP    CC providers:  MD Cathleen [facility] [id number] MELISSA Pcae CNP  [address]  [facility] [address] [place] [address] [address]

## 2019-04-16 NOTE — PROGRESS NOTES
Kevin Sanchez MD, FAASM, [facility]  Chester Barrera, MSN, RN, CNP     [address] [place] [address] SLEEP MEDICINE  [address] [place] [id number]  Dept: [phone]  Dept Fax: : [phone] [facility] MEDICINE  [address] [facility] [id number] [phone]    Subjective:     Patient ID: Letty Hernandez is a 37 y.o. male. Chief Complaint   Patient presents with    Sleep Apnea       HPI:      Machine Present today:  Yes    Machine Modem/Download Info:  Compliance (hours/night): 2.4 hrs/night  Download AHI (/hour): 1.2 /HR  Average CPAP Pressure : 10.5 cmH2O           APAP - Settings  Pressure Min: 9 cmH2O  Pressure Max: 20 cmH2O                 Comfort Settings  Humidity Level (0-8): 3  Heated Tubing (Yes/No): Yes  Flex/EPR (0-3): 3 PAP Mask  Mask Type: Nasal mask  Mask Model: Wisp  Mask Size: XL     [place] - Total score: 3    Follow-up :     Last Visit : November 2018    Titration done on 12/18/2018      Patient reports the listed chronic Co-morbidities: HTN, Afib   are well controlled and stable at this time. Subjective Health Changes: None     Over Night Oximetry: [] Yes  [] No  [x] NA   Using O2: [] Yes  [] No  [x] NA   Patient is compliant with the machine  [] Yes  [x] No   Feeling rested when using the machine   [x] Yes  [] No     Pressure is comfortable with inspiration and expiration  [x] Yes  [] No     Noticed changes in pressure   [x] Yes  [] No  [] NA   Mask is fitting well  [x] Yes  [] No   Noting Mask Air Leak  [] Yes  [x] No   Having painful Aerophagia  [] Yes  [x] No   Nocturia   0  per night.    Having  HA upon waking  [] Yes  [x] No   Dry mouth upon waking   Dry Nose  Dry Eyes  [] Yes  [x] No   Congestion upon waking   [] Yes  [x] No    Nose Bleeds  [] Yes  [x] No   Using Sleep Aides    [x] NA   Understands how to change humidification and/or tubing temperature for comfort while at home  [x] Yes  [] No     Difficulties falling asleep  [] Yes  [x] No   Difficulties staying asleep  [] Yes  [x] No   Approximate time to bed  10-11pm   Approximate wake time  4:30-8am   Taking Naps  no   If taking naps usual length    [x] NA   If taking naps using the machine  [] Yes  [] No  [x] NA   Drowsy when driving  [] Yes  [x] No     Does patient carry a DOT/CDL  [] Yes  [x] No     Does patient carry FAA/Pil[employer] License   [] Yes  [x] No      Any concerns noted with the machine at this time  [] Yes  [x] No        Diagnosis Orders   1. Obstructive sleep apnea syndrome     2. Paroxysmal atrial fibrillation (HCC)     3. Essential hypertension         The chronic medical conditions listed are directly related to the primary diagnosis listed above. The management of the primary diagnosis affects the secondary diagnosis and vice versa. Review of Systems   Constitutional: Negative for appetite change, chills, fatigue and fever. HENT: Negative for congestion, nosebleeds, rhinorrhea and sinus pressure. Eyes: Negative for pain and redness. Respiratory: Negative for apnea, cough and shortness of breath. Cardiovascular: Negative for chest pain and palpitations. Gastrointestinal: Negative for abdominal distention and abdominal pain. Neurological: Negative for dizziness and headaches. Psychiatric/Behavioral: Negative for sleep disturbance.        Social History     Socioeconomic History    Marital status:      Spouse name: Not on file    Number of children: Not on file    Years of education: Not on file    Highest education level: Not on file   Occupational History    Not on file   Social Needs    Financial resource strain: Not on file    Food insecurity:     Worry: Not on file     Inability: Not on file    Transportation needs:     Medical: Not on file     Non-medical: Not on file   Tobacco Use    Smoking status: Former Smoker     Packs/day: 0.00     Last attempt to quit: 2013     Years since quittin.8    Smokeless tobacco: Former User    Tobacco comment: quit date is 2013   Substance and Sexual Activity    Alcohol use: Yes     Comment: occ    Drug use: No    Sexual activity: Yes     Partners: Female   Lifestyle    Physical activity:     Days per week: Not on file     Minutes per session: Not on file    Stress: Not on file   Relationships    Social connections:     Talks on phone: Not on file     Gets together: Not on file     Attends Christianity service: Not on file     Active member of club or organization: Not on file     Attends meetings of clubs or organizations: Not on file     Relationship status: Not on file    Intimate partner violence:     Fear of current or ex partner: Not on file     Emotionally abused: Not on file     Physically abused: Not on file     Forced sexual activity: Not on file   Other Topics Concern    Not on file   Social History Narrative    Not on file       Prior to Admission medications    Medication Sig Start Date End Date Taking? Authorizing Provider   losartan (COZAAR) 100 MG tablet TAKE 1 TABLET BY MOUTH DAILY 18 Yes Duarte Jarquin MD   doxazosin (CARDURA) 2 MG tablet TAKE 1 TABLET BY MOUTH TWICE DAILY 18  Yes Duarte Jarquin MD   carvedilol (COREG) 25 MG tablet Take 25 mg by mouth 2 times daily (with meals)   Yes Historical Provider, MD   Sucroferric Oxyhydroxide (VELPHORO) 500 MG CHEW Take 3 each by mouth 3 times daily   Yes Historical Provider, MD   cinacalcet (SENSIPAR) 30 MG tablet Take 30 mg by mouth daily   Yes Historical Provider, MD   diltiazem (CARDIZEM CD) 120 MG extended release capsule Take 1 capsule by mouth daily 17  Yes Noy Gaitan MD   aspirin EC 81 MG EC tablet Take 1 tablet by mouth daily 17  Yes Noy Gaitan MD   darbepoetin dalia-polysorbate (ARANESP) 200 MCG/0.4ML SOSY injection Infuse 0.4 mLs intravenously once a week On .  17  Yes Dami Ferguson MD       Allergies as of 04/16/2019 - Review Complete 04/16/2019   Allergen Reaction Noted    Pollen extract  04/20/2012       Patient Active Problem List   Diagnosis    HTN (hypertension)    Proteinuria    Subcutaneous nodule of breast    Gynecomastia, male    [facility]    ESRD (end stage renal disease) on dialysis ([facility]    Paroxysmal atrial fibrillation (HCC)    Anemia of chronic renal failure, stage 5 ([facility]    Transplant recipient    Obstructive sleep apnea syndrome       Past Medical History:   Diagnosis Date    CKD (chronic kidney disease), stage III ([facility] 4/20/2012    HTN (hypertension) 4/20/2012    Obstructive sleep apnea syndrome        Past Surgical History:   Procedure Laterality Date    DIALYSIS FISTULA CREATION Right 87/38/9656    cephalic AV fistula       Family History   Problem Relation Age of Onset    High Blood Pressure Mother     High Blood Pressure Sister        Vitals:  Weight BMI   Wt Readings from Last 3 Encounters:   04/16/19 173 lb (78.5 kg)   01/22/19 173 lb (78.5 kg)   11/19/18 176 lb (79.8 kg)    Body mass index is 26.3 kg/m². BP HR SaO2   BP Readings from Last 3 Encounters:   04/16/19 118/70   01/22/19 (!) 142/90   11/19/18 (!) 158/98    Pulse Readings from Last 3 Encounters:   04/16/19 68   01/22/19 80   11/19/18 110    SpO2 Readings from Last 3 Encounters:   04/16/19 98%   01/22/19 98%   11/19/18 98%        Assessment/Plan:     Paroxysmal atrial fibrillation (HCC)  Chronic- Stable. Cont meds per PCP and other physicians. HTN (hypertension)  Chronic- Stable. Cont meds per PCP and other physicians. Obstructive sleep apnea syndrome  Chronic needing further treatment     Reviewed compliance download with pt. Supplies and parts as needed for his machine. These are medically necessary. Continue medications per his PCP and other physicians.  Limit caffeine use after 3pm.  Encouraged him to work on weight loss through diet and exercise. Diagnoses of Paroxysmal atrial fibrillation (HCC) and Essential hypertension were pertinent to this visit. The chronic medical conditions listed are directly related to the primary diagnosis listed above. The management of the primary diagnosis affects the secondary diagnosis and vice versa. The primary encounter diagnosis was Obstructive sleep apnea syndrome. Diagnoses of Paroxysmal atrial fibrillation (HCC) and Essential hypertension were also pertinent to this visit. The chronic medical conditions listed are directly related to the primary diagnosis listed above. The management of the primary diagnosis affects the secondary diagnosis and vice versa. - Educated patient and reviewed down load from [facility] with the patient   - Continue medications per his PCP and other physicians.   - he instructed not to drive unless had 4 hrs of effective therapy for his STEVE the night before. - Did review the risks of under or untreated STEVE including, but not limited to, higher risks of motor vehicle accidents, stroke, heart attacks, and death. - he understands and accepts all these risks. - The patient is advised to use the machine every night.   - Patient using  Other [employer] for supplies  - Will see back to monitor compliance  - Patient understand the need for nightly usage   -F/U: 3 months    No orders of the defined types were placed in this encounter. No orders of the defined types were placed in this encounter. No orders of the defined types were placed in this encounter.       Paola Verduzco, MSN, RN, CNP

## 2019-04-26 ENCOUNTER — OFFICE VISIT (OUTPATIENT)
Dept: PRIMARY CARE CLINIC | Age: 44
End: 2019-04-26
Payer: COMMERCIAL

## 2019-04-26 VITALS
OXYGEN SATURATION: 100 % | HEART RATE: 63 BPM | SYSTOLIC BLOOD PRESSURE: 120 MMHG | BODY MASS INDEX: 26.52 KG/M2 | WEIGHT: 175 LBS | HEIGHT: 68 IN | DIASTOLIC BLOOD PRESSURE: 62 MMHG | TEMPERATURE: 97.5 F

## 2019-04-26 DIAGNOSIS — N18.6 ESRD (END STAGE RENAL DISEASE) ON DIALYSIS (HCC): Primary | ICD-10-CM

## 2019-04-26 DIAGNOSIS — I10 ESSENTIAL HYPERTENSION: ICD-10-CM

## 2019-04-26 DIAGNOSIS — Z99.2 ESRD (END STAGE RENAL DISEASE) ON DIALYSIS (HCC): Primary | ICD-10-CM

## 2019-04-26 PROCEDURE — G8419 CALC BMI OUT NRM PARAM NOF/U: HCPCS | Performed by: INTERNAL MEDICINE

## 2019-04-26 PROCEDURE — G8427 DOCREV CUR MEDS BY ELIG CLIN: HCPCS | Performed by: INTERNAL MEDICINE

## 2019-04-26 PROCEDURE — 1036F TOBACCO NON-USER: CPT | Performed by: INTERNAL MEDICINE

## 2019-04-26 PROCEDURE — 99214 OFFICE O/P EST MOD 30 MIN: CPT | Performed by: INTERNAL MEDICINE

## 2019-04-26 RX ORDER — SPIRONOLACTONE 25 MG/1
25 TABLET ORAL DAILY
COMMUNITY
End: 2019-06-21 | Stop reason: SDUPTHER

## 2019-04-26 ASSESSMENT — ENCOUNTER SYMPTOMS
RESPIRATORY NEGATIVE: 1
EYE DISCHARGE: 0
EYES NEGATIVE: 1

## 2019-04-26 ASSESSMENT — PATIENT HEALTH QUESTIONNAIRE - PHQ9
SUM OF ALL RESPONSES TO PHQ QUESTIONS 1-9: 0
SUM OF ALL RESPONSES TO PHQ QUESTIONS 1-9: 0
SUM OF ALL RESPONSES TO PHQ9 QUESTIONS 1 & 2: 0
2. FEELING DOWN, DEPRESSED OR HOPELESS: 0
1. LITTLE INTEREST OR PLEASURE IN DOING THINGS: 0

## 2019-04-26 NOTE — PROGRESS NOTES
Natalie Prajapati II  YOB: 1975    Date of Service:  4/26/2019    Chief Complaint   Patient presents with    Hypertension     The patient has a history of fluctuating blood pressures which have occurred when the patient takes his blood pressure on his machine as well as in the dialysis unit when he gets dialyzed 3 times a week. His blood pressure at the pulmonologist a few days ago was normal and it was normal today when I took it. The patient is compliant with his medications and I have asked him to stay on his medications and we will continue to follow his blood pressure. No additional medications will be given to the patient today. I have added Spironolactone to his medication list which was started by one of the nephrologist I assume. I couldn't find reference to it in other med list that the patient did bring in the bottle for me to see so it has been added to his medication list.    Hypertension   This is a chronic problem. The current episode started more than 1 year ago. The problem has been waxing and waning since onset. The problem is controlled (Blood pressure is controlled as far as I know in a controlled environment after the patient has sat minutes). There are no associated agents to hypertension. Risk factors for coronary artery disease include male gender and stress. The current treatment provides significant improvement. There are no compliance problems. Hypertensive end-organ damage includes kidney disease. Other   This is a new (End-stage renal disease with fluctuations in blood pressure.) problem. The current episode started 1 to 4 weeks ago. The problem occurs intermittently. The problem has been unchanged. Pertinent negatives include no arthralgias or rash. The symptoms are aggravated by stress. Treatments tried: He remains on the same blood pressure medicine.        Allergies   Allergen Reactions    Pollen Extract      Outpatient Medications Marked as Taking for the 4/26/19 encounter (Office Visit) with Melany Abbasi MD   Medication Sig Dispense Refill    spironolactone (ALDACTONE) 25 MG tablet Take 25 mg by mouth daily      losartan (COZAAR) 100 MG tablet TAKE 1 TABLET BY MOUTH DAILY 90 tablet 11    doxazosin (CARDURA) 2 MG tablet TAKE 1 TABLET BY MOUTH TWICE DAILY 180 tablet 5    carvedilol (COREG) 25 MG tablet Take 25 mg by mouth 2 times daily (with meals)      Sucroferric Oxyhydroxide (VELPHORO) 500 MG CHEW Take 3 each by mouth 3 times daily      cinacalcet (SENSIPAR) 30 MG tablet Take 30 mg by mouth daily      diltiazem (CARDIZEM CD) 120 MG extended release capsule Take 1 capsule by mouth daily 30 capsule 3    aspirin EC 81 MG EC tablet Take 1 tablet by mouth daily 30 tablet 3    darbepoetin dalia-polysorbate (ARANESP) 200 MCG/0.4ML SOSY injection Infuse 0.4 mLs intravenously once a week On fridays. 1.68 mL 0       Immunization History   Administered Date(s) Administered    Hepatitis A 11/28/2017    Hepatitis A Adult (Havarix) 05/24/2018    Hepatitis B, unspecified formulation 03/01/2017, 03/29/2017, 04/28/2017, 08/25/2017    Influenza Virus Vaccine 10/12/2018    Pneumococcal 13-valent Conjugate (Cecil Lye) 11/01/2017, 11/28/2017    Pneumococcal Polysaccharide ([id number]) 05/24/2018    Tdap (Boostrix, Adacel) 02/01/2017, 11/28/2017       Past Medical History:   Diagnosis Date    CKD (chronic kidney disease), stage III ([facility] 4/20/2012    HTN (hypertension) 4/20/2012    Obstructive sleep apnea syndrome      Past Surgical History:   Procedure Laterality Date    DIALYSIS FISTULA CREATION Right 41/50/0794    cephalic AV fistula     Family History   Problem Relation Age of Onset    High Blood Pressure Mother     High Blood Pressure Sister        Review of Systems:  Review of Systems   Constitutional: Negative for activity change and appetite change. HENT: Negative. Eyes: Negative. Negative for discharge. Respiratory: Negative.     Genitourinary: Negative for difficulty urinating. Musculoskeletal: Negative for arthralgias. Skin: Negative for rash. Neurological: Negative. Psychiatric/Behavioral: Negative. Negative for agitation and confusion. The patient is not nervous/anxious. All other systems reviewed and are negative. Vitals:    04/26/19 1017 04/26/19 1021 04/26/19 1046   BP: (!) 151/87 (!) 158/88 120/62   Pulse: 63     Temp: 97.5 °F (36.4 °C)     TempSrc: Oral     SpO2: 100%     Weight: 175 lb (79.4 kg)     Height: 5' 8\" (1.727 m)       Body mass index is 26.61 kg/m². Wt Readings from Last 3 Encounters:   04/26/19 175 lb (79.4 kg)   04/16/19 173 lb (78.5 kg)   01/22/19 173 lb (78.5 kg)     BP Readings from Last 3 Encounters:   04/26/19 120/62   04/16/19 118/70   01/22/19 (!) 142/90         Physical Exam   Constitutional: He is oriented to person, place, and time. He appears well-developed and well-nourished. HENT:   Head: Normocephalic and atraumatic. Eyes: Pupils are equal, round, and reactive to light. Conjunctivae and EOM are normal.   Neck: Normal range of motion. Neck supple. Cardiovascular: Normal rate, regular rhythm and normal heart sounds. Pulmonary/Chest: Effort normal and breath sounds normal.   Musculoskeletal: Normal range of motion. Neurological: He is alert and oriented to person, place, and time. Skin: Skin is warm and dry. Psychiatric: He has a normal mood and affect.  His behavior is normal. Thought content normal.       Lab Review   Hospital Outpatient Visit on 01/17/2019   Component Date Value    LEFT VENTRICULAR EJECTIO* 01/16/2019 Echo     Left Ventricular Ejectio* 45/80/8920 65      notapplicable      Health Maintenance   Topic Date Due    Hepatitis B Vaccine (1 of 3 - Risk Recombivax 3-dose series) 11/24/1992    Potassium monitoring  06/20/2019    Creatinine monitoring  06/20/2019    Lipid screen  02/15/2022    Pneumococcal 0-64 years Vaccine (3 of 3 - PPSV23) 05/24/2023    DTaP/Tdap/Td

## 2019-05-07 ENCOUNTER — OFFICE VISIT (OUTPATIENT)
Dept: CARDIOLOGY CLINIC | Age: 44
End: 2019-05-07
Payer: COMMERCIAL

## 2019-05-07 VITALS
BODY MASS INDEX: 26.37 KG/M2 | OXYGEN SATURATION: 97 % | HEIGHT: 68 IN | WEIGHT: 174 LBS | DIASTOLIC BLOOD PRESSURE: 70 MMHG | HEART RATE: 66 BPM | SYSTOLIC BLOOD PRESSURE: 130 MMHG

## 2019-05-07 DIAGNOSIS — Z99.2 ESRD (END STAGE RENAL DISEASE) ON DIALYSIS (HCC): Primary | ICD-10-CM

## 2019-05-07 DIAGNOSIS — N18.6 ESRD (END STAGE RENAL DISEASE) ON DIALYSIS (HCC): Primary | ICD-10-CM

## 2019-05-07 DIAGNOSIS — I10 ESSENTIAL HYPERTENSION: Chronic | ICD-10-CM

## 2019-05-07 DIAGNOSIS — I48.0 PAROXYSMAL ATRIAL FIBRILLATION (HCC): Chronic | ICD-10-CM

## 2019-05-07 PROCEDURE — 1036F TOBACCO NON-USER: CPT | Performed by: NURSE PRACTITIONER

## 2019-05-07 PROCEDURE — G8419 CALC BMI OUT NRM PARAM NOF/U: HCPCS | Performed by: NURSE PRACTITIONER

## 2019-05-07 PROCEDURE — 99214 OFFICE O/P EST MOD 30 MIN: CPT | Performed by: NURSE PRACTITIONER

## 2019-05-07 PROCEDURE — G8427 DOCREV CUR MEDS BY ELIG CLIN: HCPCS | Performed by: NURSE PRACTITIONER

## 2019-05-07 NOTE — PROGRESS NOTES
[facility], [place], operated by [facility]     Outpatient Follow Up Note    CHIEF COMPLAINT / HPI: Follow Up secondary to atrial fibrillation/ HTN/ and ESRD    Aravind Guadarrama II is 37 y.o. male who presents today for a routine follow up    Subjective:     CC: HTN/ESRD  At today's office visit patient is doing well. He denies any chest pain, palpitations, SOB, dizziness, or edema. He is currently in regular rhythm. He is currently being work up for possible kidney transplant at [facility] and [facility]  [place]. Pt being told he is \"near the top\" of the list and is eager to get transplant completed. He has dialysis three times a week on //. There are no new changes with him.   Overall he feels good     Past Medical History:   Diagnosis Date    CKD (chronic kidney disease), stage III ([facility] [address] [facility] 2012    HTN (hypertension) 2012    Obstructive sleep apnea syndrome      Social History:    Social History     Tobacco Use   Smoking Status Former Smoker    Packs/day: 0.00    Last attempt to quit: 2013    Years since quittin.8   Smokeless Tobacco Former User   Tobacco Comment    quit date is 2013     Current Medications:  Current Outpatient Medications   Medication Sig Dispense Refill    spironolactone (ALDACTONE) 25 MG tablet Take 25 mg by mouth daily      losartan (COZAAR) 100 MG tablet TAKE 1 TABLET BY MOUTH DAILY 90 tablet 11    doxazosin (CARDURA) 2 MG tablet TAKE 1 TABLET BY MOUTH TWICE DAILY 180 tablet 5    carvedilol (COREG) 25 MG tablet Take 25 mg by mouth 2 times daily (with meals)      Sucroferric Oxyhydroxide (VELPHORO) 500 MG CHEW Take 3 each by mouth daily       cinacalcet (SENSIPAR) 30 MG tablet Take 30 mg by mouth daily      diltiazem (CARDIZEM CD) 120 MG extended release capsule Take 1 capsule by mouth daily 30 capsule 3    aspirin EC 81 MG EC tablet Take 1 tablet by mouth daily 30 tablet 3    darbepoetin dalia-polysorbate (ARANESP) 200 MCG/0.4ML SOSY injection Infuse 0.4 mLs bilaterally. Fistula R lower arm    DATA:    Lab Results   Component Value Date    ALT 27 2018    AST 21 2018    ALKPHOS 88 2018    BILITOT 0.4 2018     Lab Results   Component Value Date    CREATININE 6.0 (HH) 2018    BUN 28 (H) 2018     2018    K 3.1 (L) 2018    CL 94 (L) 2018    CO2 36 (H) 2018     Lab Results   Component Value Date    TSH 1.46 2017     Lab Results   Component Value Date    WBC 3.6 (L) 2018    HGB 11.2 (L) 2018    HCT 32.9 (L) 2018    .6 (H) 2018     2018     No components found for: CHLPL  Lab Results   Component Value Date    TRIG 87 02/15/2017     Lab Results   Component Value Date    HDL 41 02/15/2017     Lab Results   Component Value Date    LDLCALC 90 02/15/2017     Lab Results   Component Value Date    LABVLDL 17 02/15/2017     Radiology Review:  Pertinent images / reports were reviewed as a part of this visit and reveals the following:    Recent testing and relevant Cardiac history:  EC18  Diagnosis   Final 2018  8:57 PM 14   Normal sinus rhythm   Voltage criteria for left ventricular hypertrophy   Abnormal ECG     Confirmed by Wesley Richards MD, Carlos Cummins Interpretation    No EKG evidence for ischemia with exercise     -  Left ventricle - mild concentric LVH, normal size and function with EF of   65%     Mitral valve - mild regurgitation.     Tricuspid valve - mild regurgitation with a RVSP of 23 mmHg    Echo 17:  -Normal left ventricle size, wall thickness and systolic function with an estimated ejection fraction of 55%. -Trivial mitral regurgitation is present.   -There is trivial tricuspid regurgitation with RVSP estimated at 36 mmHg. Anticoagulation for PAF: none, [id number] score = 1.     Assessment:   Paroxysmal atrial fibrillation              -new onset in 2017              -currently in regular rhythm             - continue Cardizem and coreg     -HTN              -stable, continue current medications   -Discussed checking and logging BP on non-HD days     -ESRD               - HD on M, W, F   - Followed by nephrologist, Dr. Barillas April    -STEVE   -Continue to wear CPAP at night   -Followed by Dr. Lena Pratt from [address] [place] [address].:   Continue current medications  Follow up in six months         The patient  currently  is not smoking. The risks related to smoking were reviewed with the patient. Recommend maintaining a smoke-free lifestyle. Products available for smoking cessation were discussed. Daily weight, low sodium diet were discussed. Patient instructed to call the office with a weight gain: > 3 lbs over night or 5 lbs in one week; swelling, SOB/orthopnea/PND    Pt is on a BB  Pt is on an ace-i/ARB  Pt is not on a statin    Saturated fat diet discussed  Exercise program discussed    Thank you for allowing to us to participate in the care of [address] [place] [address]

## 2019-05-07 NOTE — LETTER
[address] [place] [address] Cardiology [place] [address] [address] [id number]  Phone: [phone]  Fax: [phone]    MELISSA Juarez CNP          May 7, 2019       Patient: Mary Fonseca II   MR Number: [id number]   YOB: 1975   Date of Visit: 2019         [address]     Outpatient Follow Up Note    CHIEF COMPLAINT / HPI: Follow Up secondary to atrial fibrillation/ HTN/ and ESRD    Mary Fonseca II is 37 y.o. male who presents today for a routine follow up    Subjective:     CC: HTN/ESRD  At today's office visit patient is doing well. He denies any chest pain, palpitations, SOB, dizziness, or edema. He is currently in regular rhythm. He is currently being work up for possible kidney transplant at [address] [place] [address] and [facility]. Pt being told he is \"near the top\" of the list and is eager to get transplant completed. He has dialysis three times a week on //. There are no new changes with him.   Overall he feels good     Past Medical History:   Diagnosis Date    CKD (chronic kidney disease), stage III ([address] 2012    HTN (hypertension) 2012    Obstructive sleep apnea syndrome      Social History:    Social History     Tobacco Use   Smoking Status Former Smoker    Packs/day: 0.00    Last attempt to quit: 2013    Years since quittin.8   Smokeless Tobacco Former User   Tobacco Comment    quit date is 2013     Current Medications:  Current Outpatient Medications   Medication Sig Dispense Refill    spironolactone (ALDACTONE) 25 MG tablet Take 25 mg by mouth daily      losartan (COZAAR) 100 MG tablet TAKE 1 TABLET BY MOUTH DAILY 90 tablet 11    doxazosin (CARDURA) 2 MG tablet TAKE 1 TABLET BY MOUTH TWICE DAILY 180 tablet 5    carvedilol (COREG) 25 MG tablet Take 25 mg by mouth 2 times daily (with meals)      Sucroferric Oxyhydroxide (VELPHORO) 500 MG CHEW Take 3 each by mouth daily function with an estimated ejection fraction of 55%. -Trivial mitral regurgitation is present.   -There is trivial tricuspid regurgitation with RVSP estimated at 36 mmHg. Anticoagulation for PAF: none, [id number] score = 1. Assessment:   Paroxysmal atrial fibrillation              -new onset in August 2017              -currently in regular rhythm             - continue Cardizem and coreg     -HTN              -stable, continue current medications   -Discussed checking and logging BP on non-HD days     -ESRD               - HD on M, W, F   - Followed by nephrologist, Dr. Basilio Herron    -STEVE   -Continue to wear CPAP at night   -Followed by Dr. Mila Renee from [address] [place] [address].:   Continue current medications  Follow up in six months         The patient  currently  is not smoking. The risks related to smoking were reviewed with the patient. Recommend maintaining a smoke-free lifestyle. Products available for smoking cessation were discussed. Daily weight, low sodium diet were discussed. Patient instructed to call the office with a weight gain: > 3 lbs over night or 5 lbs in one week; swelling, SOB/orthopnea/PND    Pt is on a BB  Pt is on an ace-i/ARB  Pt is not on a statin    Saturated fat diet discussed  Exercise program discussed    Thank you for allowing to us to participate in the care of [facility]. [address] [place] [address] Rd      I am requesting a consultation of my patient Ramona Allen II, to you for evaluation of ***. I appreciate your assistance in his care and look forward to your findings and recommendations.     Sincerely,                           MELISSA Weathers - CNP

## 2019-06-21 RX ORDER — DOXAZOSIN 2 MG/1
TABLET ORAL
Qty: 180 TABLET | Refills: 5 | Status: SHIPPED | OUTPATIENT
Start: 2019-06-21 | End: 2019-11-12 | Stop reason: ALTCHOICE

## 2019-06-21 RX ORDER — SPIRONOLACTONE 25 MG/1
25 TABLET ORAL DAILY
Qty: 90 TABLET | Refills: 1 | Status: SHIPPED | OUTPATIENT
Start: 2019-06-21 | End: 2019-11-12 | Stop reason: ALTCHOICE

## 2019-06-21 RX ORDER — LOSARTAN POTASSIUM 100 MG/1
100 TABLET ORAL DAILY
Qty: 90 TABLET | Refills: 11 | Status: SHIPPED | OUTPATIENT
Start: 2019-06-21 | End: 2019-11-12 | Stop reason: ALTCHOICE

## 2019-06-21 RX ORDER — CARVEDILOL 25 MG/1
25 TABLET ORAL 2 TIMES DAILY WITH MEALS
Qty: 180 TABLET | Refills: 1 | Status: SHIPPED | OUTPATIENT
Start: 2019-06-21 | End: 2020-05-12 | Stop reason: DRUGHIGH

## 2019-07-24 ENCOUNTER — OFFICE VISIT (OUTPATIENT)
Dept: PULMONOLOGY | Age: 44
End: 2019-07-24
Payer: COMMERCIAL

## 2019-07-24 VITALS
BODY MASS INDEX: 27.43 KG/M2 | DIASTOLIC BLOOD PRESSURE: 76 MMHG | WEIGHT: 181 LBS | HEIGHT: 68 IN | HEART RATE: 74 BPM | SYSTOLIC BLOOD PRESSURE: 126 MMHG | OXYGEN SATURATION: 97 %

## 2019-07-24 DIAGNOSIS — N18.6 ESRD (END STAGE RENAL DISEASE) ON DIALYSIS (HCC): ICD-10-CM

## 2019-07-24 DIAGNOSIS — I48.0 PAROXYSMAL ATRIAL FIBRILLATION (HCC): Chronic | ICD-10-CM

## 2019-07-24 DIAGNOSIS — Z99.2 ESRD (END STAGE RENAL DISEASE) ON DIALYSIS (HCC): ICD-10-CM

## 2019-07-24 DIAGNOSIS — I10 ESSENTIAL HYPERTENSION: Chronic | ICD-10-CM

## 2019-07-24 DIAGNOSIS — G47.33 OBSTRUCTIVE SLEEP APNEA SYNDROME: Primary | ICD-10-CM

## 2019-07-24 PROCEDURE — G8427 DOCREV CUR MEDS BY ELIG CLIN: HCPCS | Performed by: NURSE PRACTITIONER

## 2019-07-24 PROCEDURE — 99214 OFFICE O/P EST MOD 30 MIN: CPT | Performed by: NURSE PRACTITIONER

## 2019-07-24 PROCEDURE — 1036F TOBACCO NON-USER: CPT | Performed by: NURSE PRACTITIONER

## 2019-07-24 PROCEDURE — G8419 CALC BMI OUT NRM PARAM NOF/U: HCPCS | Performed by: NURSE PRACTITIONER

## 2019-07-24 ASSESSMENT — SLEEP AND FATIGUE QUESTIONNAIRES
HOW LIKELY ARE YOU TO NOD OFF OR FALL ASLEEP WHEN YOU ARE A PASSENGER IN A CAR FOR AN HOUR WITHOUT A BREAK: 0
HOW LIKELY ARE YOU TO NOD OFF OR FALL ASLEEP WHILE LYING DOWN TO REST IN THE AFTERNOON WHEN CIRCUMSTANCES PERMIT: 1
ESS TOTAL SCORE: 3
HOW LIKELY ARE YOU TO NOD OFF OR FALL ASLEEP IN A CAR, WHILE STOPPED FOR A FEW MINUTES IN TRAFFIC: 0
HOW LIKELY ARE YOU TO NOD OFF OR FALL ASLEEP WHILE SITTING INACTIVE IN A PUBLIC PLACE: 0
HOW LIKELY ARE YOU TO NOD OFF OR FALL ASLEEP WHILE SITTING AND READING: 1
HOW LIKELY ARE YOU TO NOD OFF OR FALL ASLEEP WHILE SITTING QUIETLY AFTER LUNCH WITHOUT ALCOHOL: 0
HOW LIKELY ARE YOU TO NOD OFF OR FALL ASLEEP WHILE SITTING AND TALKING TO SOMEONE: 0
HOW LIKELY ARE YOU TO NOD OFF OR FALL ASLEEP WHILE WATCHING TV: 1

## 2019-07-24 ASSESSMENT — ENCOUNTER SYMPTOMS
COUGH: 0
SHORTNESS OF BREATH: 0
ABDOMINAL DISTENTION: 0
ABDOMINAL PAIN: 0
APNEA: 0
RHINORRHEA: 0
SINUS PRESSURE: 0
EYE REDNESS: 0
EYE PAIN: 0

## 2019-07-24 NOTE — LETTER
[facility]  [address]  [facility] [place] [address]  Phone: [phone]  Fax: [phone]    July 24, 2019       Patient: Candis Uribe II   MR Number: [id number]   YOB: 1975   Date of Visit: 7/24/2019       Candis Uribe II was seen for a follow up visit today. Here is my assessment and plan as well as an attached copy of his visit today:    Paroxysmal atrial fibrillation ([address]  Chronic- Stable. Cont meds per PCP and other physicians. HTN (hypertension)  Chronic- Stable. Cont meds per PCP and other physicians. ESRD (end stage renal disease) on dialysis (HCC)  Chronic- Stable. Cont meds per PCP and other physicians. Obstructive sleep apnea syndrome  Chronic     Partially treated    Reviewed compliance download with pt. Supplies and parts as needed for his machine. These are medically necessary. Continue medications per his PCP and other physicians. Limit caffeine use after 3pm.  Encouraged him to work on weight loss through diet and exercise. Diagnoses of Paroxysmal atrial fibrillation ([address] Essential hypertension, and ESRD (end stage renal disease) on dialysis [facility]) were pertinent to this visit. The chronic medical conditions listed are directly related to the primary diagnosis listed above. The management of the primary diagnosis affects the secondary diagnosis and vice versa. If you have questions or concerns, please do not hesitate to call me. I look forward to following Jigar Hernandez along with you.     Sincerely,    MELISSA Jeter CNP    CC providers:  Partha Hwang MD  [address], APRN - CNP  [address]

## 2019-07-24 NOTE — PROGRESS NOTES
No     Difficulties falling asleep  [] Yes  [x] No   Difficulties staying asleep  [] Yes  [x] No   Approximate time to bed  11pm-12am   Approximate wake time  5am   Taking Naps  no   If taking naps usual length    [x] NA   If taking naps using the machine  [] Yes  [] No  [x] NA   Drowsy when driving  [] Yes  [x] No     Does patient carry a DOT/CDL  [] Yes  [x] No     Does patient carry FAA/[employer] License   [] Yes  [x] No      Any concerns noted with the machine at this time  [] Yes  [x] No        Diagnosis Orders   1. Obstructive sleep apnea syndrome     2. Paroxysmal atrial fibrillation (HCC)     3. Essential hypertension     4. ESRD (end stage renal disease) on dialysis [facility])         The chronic medical conditions listed are directly related to the primary diagnosis listed above. The management of the primary diagnosis affects the secondary diagnosis and vice versa. Review of Systems   Constitutional: Negative for appetite change, chills, fatigue and fever. HENT: Negative for congestion, nosebleeds, rhinorrhea and sinus pressure. Eyes: Negative for pain and redness. Respiratory: Negative for apnea, cough and shortness of breath. Cardiovascular: Negative for chest pain and palpitations. Gastrointestinal: Negative for abdominal distention and abdominal pain. Neurological: Negative for dizziness and headaches. Psychiatric/Behavioral: Negative for sleep disturbance.        Social History     Socioeconomic History    Marital status:      Spouse name: Not on file    Number of children: Not on file    Years of education: Not on file    Highest education level: Not on file   Occupational History    Not on file   Social Needs    Financial resource strain: Not on file    Food insecurity:     Worry: Not on file     Inability: Not on file    Transportation needs:     Medical: Not on file     Non-medical: Not on file   Tobacco Use    Smoking status: Former Smoker     Packs/day: 0.00 darbepoetin dalia-polysorbate (ARANESP) 200 MCG/0.4ML SOSY injection Infuse 0.4 mLs intravenously once a week On fridays. 2/20/17  Yes Katlyn Crump MD       Allergies as of 07/24/2019 - Review Complete 07/24/2019   Allergen Reaction Noted    Pollen extract  04/20/2012       Patient Active Problem List   Diagnosis    HTN (hypertension)    Proteinuria    Subcutaneous nodule of breast    Gynecomastia, male    SHERMAN (acute kidney injury) ([address]    ESRD (end stage renal disease) on dialysis ([address]    Paroxysmal atrial fibrillation (HCC)    Anemia of chronic renal failure, stage 5 ([address]    Transplant recipient    Obstructive sleep apnea syndrome       Past Medical History:   Diagnosis Date    CKD (chronic kidney disease), stage III ([address] 4/20/2012    HTN (hypertension) 4/20/2012    Obstructive sleep apnea syndrome        Past Surgical History:   Procedure Laterality Date    DIALYSIS FISTULA CREATION Right 10/62/0761    cephalic AV fistula       Family History   Problem Relation Age of Onset    High Blood Pressure Mother     High Blood Pressure Sister        Vitals:  Weight BMI   Wt Readings from Last 3 Encounters:   07/24/19 181 lb (82.1 kg)   05/07/19 174 lb (78.9 kg)   04/26/19 175 lb (79.4 kg)    Body mass index is 27.52 kg/m². BP HR SaO2   BP Readings from Last 3 Encounters:   07/24/19 126/76   05/07/19 130/70   04/26/19 120/62    Pulse Readings from Last 3 Encounters:   07/24/19 74   05/07/19 66   04/26/19 63    SpO2 Readings from Last 3 Encounters:   07/24/19 97%   05/07/19 97%   04/26/19 100%        Assessment/Plan:     Paroxysmal atrial fibrillation (HCC)  Chronic- Stable. Cont meds per PCP and other physicians. HTN (hypertension)  Chronic- Stable. Cont meds per PCP and other physicians. ESRD (end stage renal disease) on dialysis (HCC)  Chronic- Stable. Cont meds per PCP and other physicians.       Obstructive sleep apnea syndrome  Chronic     Partially treated    Reviewed

## 2019-07-30 ENCOUNTER — OFFICE VISIT (OUTPATIENT)
Dept: PRIMARY CARE CLINIC | Age: 44
End: 2019-07-30
Payer: COMMERCIAL

## 2019-07-30 VITALS
SYSTOLIC BLOOD PRESSURE: 120 MMHG | DIASTOLIC BLOOD PRESSURE: 70 MMHG | BODY MASS INDEX: 27.58 KG/M2 | WEIGHT: 182 LBS | HEIGHT: 68 IN | HEART RATE: 71 BPM | TEMPERATURE: 98.2 F | OXYGEN SATURATION: 98 %

## 2019-07-30 DIAGNOSIS — Z99.2 ESRD (END STAGE RENAL DISEASE) ON DIALYSIS (HCC): ICD-10-CM

## 2019-07-30 DIAGNOSIS — I10 ESSENTIAL HYPERTENSION: Chronic | ICD-10-CM

## 2019-07-30 DIAGNOSIS — N18.6 ESRD (END STAGE RENAL DISEASE) ON DIALYSIS (HCC): ICD-10-CM

## 2019-07-30 PROCEDURE — G8427 DOCREV CUR MEDS BY ELIG CLIN: HCPCS | Performed by: INTERNAL MEDICINE

## 2019-07-30 PROCEDURE — 99214 OFFICE O/P EST MOD 30 MIN: CPT | Performed by: INTERNAL MEDICINE

## 2019-07-30 PROCEDURE — G8419 CALC BMI OUT NRM PARAM NOF/U: HCPCS | Performed by: INTERNAL MEDICINE

## 2019-07-30 PROCEDURE — 1036F TOBACCO NON-USER: CPT | Performed by: INTERNAL MEDICINE

## 2019-07-30 ASSESSMENT — ENCOUNTER SYMPTOMS
RESPIRATORY NEGATIVE: 1
EYE DISCHARGE: 0
EYES NEGATIVE: 1

## 2019-07-30 NOTE — PROGRESS NOTES
Kindra Flynn II  YOB: 1975    Date of Service:  7/30/2019    Chief Complaint   Patient presents with    Hypertension     Doing well . Recently started on an additional BP medication by nephrology and BP has come down to normal.    Hypertension   This is a chronic (htn and ESRD on dialysis and on the transplant list.) problem. The current episode started more than 1 year ago. The problem has been rapidly improving since onset. The problem is controlled. Pertinent negatives include no anxiety. There are no associated agents to hypertension. Risk factors for coronary artery disease include male gender. Past treatments include diuretics, beta blockers, angiotensin blockers and central alpha agonists. The current treatment provides significant improvement. There are no compliance problems. Hypertensive end-organ damage includes kidney disease. Allergies   Allergen Reactions    Pollen Extract      Outpatient Medications Marked as Taking for the 7/30/19 encounter (Office Visit) with Mohsen Watts MD   Medication Sig Dispense Refill    spironolactone (ALDACTONE) 25 MG tablet Take 1 tablet by mouth daily 90 tablet 1    losartan (COZAAR) 100 MG tablet Take 1 tablet by mouth daily 90 tablet 11    doxazosin (CARDURA) 2 MG tablet TAKE 1 TABLET BY MOUTH TWICE DAILY 180 tablet 5    carvedilol (COREG) 25 MG tablet Take 1 tablet by mouth 2 times daily (with meals) 180 tablet 1    Sucroferric Oxyhydroxide (VELPHORO) 500 MG CHEW Take 3 each by mouth daily       cinacalcet (SENSIPAR) 30 MG tablet Take 30 mg by mouth daily      diltiazem (CARDIZEM CD) 120 MG extended release capsule Take 1 capsule by mouth daily 30 capsule 3    aspirin EC 81 MG EC tablet Take 1 tablet by mouth daily 30 tablet 3    darbepoetin dalia-polysorbate (ARANESP) 200 MCG/0.4ML SOSY injection Infuse 0.4 mLs intravenously once a week On fridays.  1.68 mL 0       Immunization History   Administered Date(s) Administered

## 2019-10-24 ENCOUNTER — NURSE ONLY (OUTPATIENT)
Dept: PRIMARY CARE CLINIC | Age: 44
End: 2019-10-24
Payer: COMMERCIAL

## 2019-10-24 DIAGNOSIS — Z23 NEED FOR IMMUNIZATION AGAINST INFLUENZA: Primary | ICD-10-CM

## 2019-10-24 PROCEDURE — 90471 IMMUNIZATION ADMIN: CPT | Performed by: INTERNAL MEDICINE

## 2019-10-24 PROCEDURE — 90686 IIV4 VACC NO PRSV 0.5 ML IM: CPT | Performed by: INTERNAL MEDICINE

## 2019-11-12 ENCOUNTER — OFFICE VISIT (OUTPATIENT)
Dept: CARDIOLOGY CLINIC | Age: 44
End: 2019-11-12
Payer: MEDICARE

## 2019-11-12 VITALS
BODY MASS INDEX: 26.22 KG/M2 | DIASTOLIC BLOOD PRESSURE: 90 MMHG | SYSTOLIC BLOOD PRESSURE: 142 MMHG | OXYGEN SATURATION: 98 % | HEIGHT: 68 IN | WEIGHT: 173 LBS | HEART RATE: 66 BPM

## 2019-11-12 DIAGNOSIS — Z94.89 TRANSPLANT RECIPIENT: ICD-10-CM

## 2019-11-12 DIAGNOSIS — I10 ESSENTIAL HYPERTENSION: Primary | Chronic | ICD-10-CM

## 2019-11-12 PROCEDURE — G8482 FLU IMMUNIZE ORDER/ADMIN: HCPCS | Performed by: NURSE PRACTITIONER

## 2019-11-12 PROCEDURE — 1036F TOBACCO NON-USER: CPT | Performed by: NURSE PRACTITIONER

## 2019-11-12 PROCEDURE — G8427 DOCREV CUR MEDS BY ELIG CLIN: HCPCS | Performed by: NURSE PRACTITIONER

## 2019-11-12 PROCEDURE — G8419 CALC BMI OUT NRM PARAM NOF/U: HCPCS | Performed by: NURSE PRACTITIONER

## 2019-11-12 PROCEDURE — 99214 OFFICE O/P EST MOD 30 MIN: CPT | Performed by: NURSE PRACTITIONER

## 2019-11-12 RX ORDER — SOD PHOS DI, MONO/K PHOS MONO 250 MG
TABLET ORAL
Refills: 3 | COMMUNITY
Start: 2019-11-02 | End: 2020-05-12

## 2019-11-12 RX ORDER — TACROLIMUS 1 MG/1
1 CAPSULE ORAL 2 TIMES DAILY
COMMUNITY
End: 2020-05-12 | Stop reason: ALTCHOICE

## 2019-11-12 RX ORDER — FAMOTIDINE 20 MG/1
20 TABLET, FILM COATED ORAL 2 TIMES DAILY
COMMUNITY
End: 2020-05-12

## 2019-11-12 RX ORDER — MAGNESIUM OXIDE 400 MG/1
400 TABLET ORAL DAILY
COMMUNITY

## 2019-11-12 RX ORDER — SULFAMETHOXAZOLE AND TRIMETHOPRIM 400; 80 MG/1; MG/1
1 TABLET ORAL
COMMUNITY
End: 2021-03-03

## 2019-11-12 RX ORDER — MYCOPHENOLATE MOFETIL 250 MG/1
CAPSULE ORAL 2 TIMES DAILY
COMMUNITY
End: 2020-05-12

## 2020-03-05 ENCOUNTER — HOSPITAL ENCOUNTER (EMERGENCY)
Age: 45
Discharge: HOME | End: 2020-03-05
Attending: EMERGENCY MEDICINE
Payer: COMMERCIAL

## 2020-03-05 VITALS
RESPIRATION RATE: 14 BRPM | DIASTOLIC BLOOD PRESSURE: 84 MMHG | SYSTOLIC BLOOD PRESSURE: 142 MMHG | OXYGEN SATURATION: 99 % | TEMPERATURE: 97.7 F | HEART RATE: 60 BPM

## 2020-03-05 PROCEDURE — 2500000003 HC RX 250 WO HCPCS: Performed by: PHYSICIAN ASSISTANT

## 2020-03-05 PROCEDURE — 6370000000 HC RX 637 (ALT 250 FOR IP): Performed by: PHYSICIAN ASSISTANT

## 2020-03-05 PROCEDURE — 12001 RPR S/N/AX/GEN/TRNK 2.5CM/<: CPT

## 2020-03-05 PROCEDURE — 99283 EMERGENCY DEPT VISIT LOW MDM: CPT

## 2020-03-05 RX ORDER — GINSENG 100 MG
CAPSULE ORAL ONCE
Status: COMPLETED | OUTPATIENT
Start: 2020-03-05 | End: 2020-03-05

## 2020-03-05 RX ORDER — LIDOCAINE HYDROCHLORIDE AND EPINEPHRINE 10; 10 MG/ML; UG/ML
20 INJECTION, SOLUTION INFILTRATION; PERINEURAL ONCE
Status: COMPLETED | OUTPATIENT
Start: 2020-03-05 | End: 2020-03-05

## 2020-03-05 RX ADMIN — LIDOCAINE HYDROCHLORIDE,EPINEPHRINE BITARTRATE 20 ML: 10; .01 INJECTION, SOLUTION INFILTRATION; PERINEURAL at 11:56

## 2020-03-05 RX ADMIN — BACITRACIN: 500 OINTMENT TOPICAL at 13:19

## 2020-03-05 ASSESSMENT — PAIN SCALES - GENERAL: PAINLEVEL_OUTOF10: 0

## 2020-03-05 NOTE — ED PROVIDER NOTES
[address] ENCOUNTER          PHYSICIAN ASSISTANT NOTE       Date of evaluation: 3/5/2020    Chief Complaint     Work Related Injury (laceration to right knee from knife at work)      History of Present Illness     Austin Vazquez II is a 53 y.o. male with PMH of hypertension, renal transplant on immunosuppressants and bactrim who presents with laceration to right knee. Patient was at work cutting a copper pipe with a knife when it slipped and cut his right knee. Patient is endorsing minimal pain. Tetanus status is up to date. Review of Systems     Review of Systems   Musculoskeletal: Negative for arthralgias, gait problem and joint swelling. Skin: Positive for wound. Past Medical, Surgical, Family, and Social History     He has a past medical history of CKD (chronic kidney disease), stage III ([address] HTN (hypertension), and Obstructive sleep apnea syndrome. He has a past surgical history that includes Dialysis fistula creation (Right, 06/14/2017). His family history includes High Blood Pressure in his mother and sister. He reports that he quit smoking about 6 years ago. He smoked 0.00 packs per day. He has quit using smokeless tobacco. He reports current alcohol use. He reports that he does not use drugs.     Medications     Discharge Medication List as of 3/5/2020  1:08 PM      CONTINUE these medications which have NOT CHANGED    Details   magnesium oxide (MAG-OX) 400 MG tablet Take 400 mg by mouth dailyHistorical Med      K Phos Mono-Sod Phos Di & Mono (VIRT-PHOS 250 NEUTRAL) 155-852-130 MG TABS TK 1 T PO BID, R-3Historical Med      tacrolimus (PROGRAF) 1 MG capsule Take 1 mg by mouth 2 times daily 3 on the am,4 tabs in the pmHistorical Med      valGANciclovir HCl (VALCYTE PO) Take by mouth 250 mg dailyHistorical Med      mycophenolate (CELLCEPT) 250 MG capsule Take by mouth 2 times dailyHistorical Med      famotidine (PEPCID) 20 MG tablet Take 20 mg by mouth 2 times anesthetic:  Lidocaine 1% WITH epi  Laceration details:     Location:  Leg    Leg location:  R knee    Length (cm):  1.5    Depth (mm):  5  Repair type:     Repair type: Intermediate  Pre-procedure details:     Preparation:  Patient was prepped and draped in usual sterile fashion  Exploration:     Hemostasis achieved with:  Direct pressure    Wound exploration: entire depth of wound probed and visualized      Contaminated: no    Treatment:     Area cleansed with:  Hibiclens    Amount of cleaning:  Extensive    Irrigation solution:  Sterile saline    Irrigation volume:  1000mL    Irrigation method:  Syringe  Subcutaneous repair:     Suture size:  6-0    Suture material:  Fast-absorbing gut    Suture technique:  Simple interrupted    Number of sutures:  3  Skin repair:     Repair method:  Sutures    Suture size:  4-0    Suture material:  Prolene    Suture technique:  Simple interrupted    Number of sutures:  4  Approximation:     Approximation:  Close  Post-procedure details:     Dressing:  Antibiotic ointment and sterile dressing    Patient tolerance of procedure: Tolerated well, no immediate complications         ED Course     Nursing Notes, Past Medical Hx,Past Surgical Hx, Social Hx, Allergies, and Family Hx were reviewed. The patient was given the following medications:  Orders Placed This Encounter   Medications    lidocaine-EPINEPHrine 1 [id number] injection 20 mL    bacitracin ointment       CONSULTS:  None    MEDICAL DECISION MAKING / ASSESSMENT / Frank Ab GLASS is a 53 y.o. male with complaints of laceration. On presentation, patient is well-appearing and in no acute distress. Patient is afebrile with stable VS.    Please see procedure note above for details of laceration repair. Wound was explored thoroughly and no foreign bodies were appreciated. Laceration does not extend into the joint space. Patient has full range of motion of the knee. Tetanus status is up-to-date.  Patient was instructed to follow up for suture removal in 8-10 days. At this point in time, patient is stable for discharge. Patient given strict return precautions as outlined in the AVS. Patient was agreeable and understanding to this plan of care. Prior to discharge, patient was ambulatory and PO tolerant. This patient was also evaluated by the attending physician. All care plans were discussed and agreed upon. Clinical Impression     1.  Laceration of right lower extremity, initial encounter        Disposition     PATIENT REFERRED TO:  Sheron Varghese MD  [phone]            DISCHARGE MEDICATIONS:  Discharge Medication List as of 3/5/2020  1:08 PM          DISPOSITION Decision To Discharge 03/05/2020 01:01:51 PM        Juan Antonio Kirkpatrick PA-C  03/05/20 [id number]

## 2020-03-05 NOTE — ED NOTES
Bacitracin and dressing placed to right knee. Pt discharged with written instructions. Pt verbalizes understanding and walked to lob without difficulty.      Davey Garcia RN  03/05/20 [id number]

## 2020-03-13 ENCOUNTER — HOSPITAL ENCOUNTER (EMERGENCY)
Age: 45
Discharge: HOME | End: 2020-03-13
Attending: EMERGENCY MEDICINE
Payer: MEDICARE

## 2020-03-13 VITALS
SYSTOLIC BLOOD PRESSURE: 140 MMHG | TEMPERATURE: 97.7 F | WEIGHT: 173 LBS | OXYGEN SATURATION: 100 % | BODY MASS INDEX: 26.22 KG/M2 | RESPIRATION RATE: 16 BRPM | HEIGHT: 68 IN | HEART RATE: 58 BPM | DIASTOLIC BLOOD PRESSURE: 97 MMHG

## 2020-03-13 PROCEDURE — 99281 EMR DPT VST MAYX REQ PHY/QHP: CPT

## 2020-03-13 ASSESSMENT — ENCOUNTER SYMPTOMS
VOMITING: 0
ABDOMINAL PAIN: 0
NAUSEA: 0
SHORTNESS OF BREATH: 0
COUGH: 0

## 2020-03-13 NOTE — ED NOTES
Patient prepared for and ready to be discharged. Patient discharged at this time in no acute distress after verbalizing understanding of discharge instructions. Patient left after receiving After Visit Summary instructions.       Jose Gonzales RN  03/13/20 [id number]

## 2020-03-13 NOTE — ED PROVIDER NOTES
[address] [place]          ATTENDING PHYSICIAN NOTE       Date of evaluation: 3/13/2020    Chief Complaint     Suture / Staple Removal (patient in for suture removal from right leg. placed on week ago)      History of Present Illness     Amrik Padilla II is a 53 y.o. male who presents for suture removal.  Patient seen laceration to right knee 8 days ago while at work. Sutures placed in ED, and patient has returned for suture removal.  Patient reports he has been doing well able to ambulate, swelling reduced. No bleeding or discharge from the wound. Review of Systems     Review of Systems   Constitutional: Negative for chills, fatigue and fever. Respiratory: Negative for cough and shortness of breath. Cardiovascular: Negative for chest pain, palpitations and leg swelling. Gastrointestinal: Negative for abdominal pain, nausea and vomiting. Musculoskeletal: Negative for arthralgias and myalgias. Skin: Positive for wound (healing well). Negative for pallor and rash. Past Medical, Surgical, Family, and Social History     He has a past medical history of CKD (chronic kidney disease), stage III ([address] HTN (hypertension), and Obstructive sleep apnea syndrome. He has a past surgical history that includes Dialysis fistula creation (Right, 06/14/2017). His family history includes High Blood Pressure in his mother and sister. He reports that he quit smoking about 6 years ago. He smoked 0.00 packs per day. He has quit using smokeless tobacco. He reports current alcohol use. He reports that he does not use drugs.     Medications     Discharge Medication List as of 3/13/2020 11:31 AM      CONTINUE these medications which have NOT CHANGED    Details   magnesium oxide (MAG-OX) 400 MG tablet Take 400 mg by mouth dailyHistorical Med      K Phos Mono-Sod Phos Di & Mono (VIRT-PHOS 250 NEUTRAL) 155-852-130 MG TABS TK 1 T PO BID, R-3Historical Med      tacrolimus (PROGRAF) 1

## 2020-05-11 NOTE — PROGRESS NOTES
vitals filed for this visit. Constitutional: Appears well-developed and well-nourished   No apparent distress    Mental status/ Psych: Alert and awake , Normal mood and affect   Oriented to person/place/time   Able to follow commands    HEENT/Neuro:EOM intact     Normocephalic, atraumatic. No facial asymmetry  Mucous membranes are moist.   No visualized mass   Pulmonary/Chest: Respiratory effort normal.          No visualized signs of difficulty breathing or respiratory distress  Musculoskeletal:   Normal gait with no signs of ataxia         Normal range of motion of neck  Skin:        No significant exanthematous lesions or discoloration noted on facial skin            Other pertinent observable physical exam findings:-    Due to this being a TeleHealth encounter, evaluation of the following organ systems is limited: Vitals/Constitutional/EENT/Resp/CV/GI//MS/Neuro/Skin/Heme-Lymph-Imm. Imaging:  I have reviewed the below testing personally:    Echo 1/17/19  Summary  Left ventricle - mild concentric LVH, normal size and function with EF of 65%  Mitral valve - mild regurgitation. Tricuspid valve - mild regurgitation with a RVSP of 23 mmHg. SPECT 1/17/19  Summary       No EKG evidence for ischemia with exercise       Normal LV size and systolic function.       There is normal isotope uptake at stress and rest. There is no evidence of    myocardial ischemia or scar. Lipids 8/2019:  TG 38 HDL 59 LDL 66    Impression/Recommendations    Mr. Claudean Lurie is a 53 y.o. male patient with:    PAF, CHADS2-VASc score= 1, not on anticoagulation  Hypertension, managed by transplant nephrologist   ESRD 2/2 FSGS, follows with Dr. Ilana Morales, renal transplant 8/2019 at [facility]; 12/2019 stenting of transplanted kidney arterial anastomosis  STEVE, compliant with CPAP      The following cardiovascular specific medications were confirmed at this Virtual visit (14 minutes):     Aspirin 325 mg  Carvedilol 12.5 mg

## 2020-05-12 ENCOUNTER — VIRTUAL VISIT (OUTPATIENT)
Dept: CARDIOLOGY CLINIC | Age: 45
End: 2020-05-12
Payer: MEDICARE

## 2020-05-12 VITALS — BODY MASS INDEX: 26.07 KG/M2 | HEIGHT: 68 IN | WEIGHT: 172 LBS

## 2020-05-12 PROCEDURE — G8427 DOCREV CUR MEDS BY ELIG CLIN: HCPCS | Performed by: INTERNAL MEDICINE

## 2020-05-12 PROCEDURE — 1036F TOBACCO NON-USER: CPT | Performed by: INTERNAL MEDICINE

## 2020-05-12 PROCEDURE — 99214 OFFICE O/P EST MOD 30 MIN: CPT | Performed by: INTERNAL MEDICINE

## 2020-05-12 PROCEDURE — G8419 CALC BMI OUT NRM PARAM NOF/U: HCPCS | Performed by: INTERNAL MEDICINE

## 2020-05-12 RX ORDER — CARVEDILOL 12.5 MG/1
12.5 TABLET ORAL DAILY
COMMUNITY
Start: 2020-03-12

## 2020-05-12 NOTE — LETTER
magnesium oxide (MAG-OX) 400 MG tablet Take 400 mg by mouth daily  Historical Provider, MD       Allergies:  Pollen extract     Review of Systems:   Review of Systems    Physical Examination:    Vital Signs: (As obtained by patient/caregiver at home)  Ht 5' 8\" (1.727 m)   Wt 172 lb (78 kg)   BMI 26.15 kg/m²    Ht 5' 8\" (1.727 m)   Wt 172 lb (78 kg)   BMI 26.15 kg/m²    There were no vitals filed for this visit. Constitutional: Appears well-developed and well-nourished   No apparent distress    Mental status/ Psych: Alert and awake , Normal mood and affect   Oriented to person/place/time   Able to follow commands    HEENT/Neuro:EOM intact     Normocephalic, atraumatic. No facial asymmetry  Mucous membranes are moist.   No visualized mass   Pulmonary/Chest: Respiratory effort normal.          No visualized signs of difficulty breathing or respiratory distress  Musculoskeletal:   Normal gait with no signs of ataxia         Normal range of motion of neck  Skin:        No significant exanthematous lesions or discoloration noted on facial skin            Other pertinent observable physical exam findings:-    Due to this being a TeleHealth encounter, evaluation of the following organ systems is limited: Vitals/Constitutional/EENT/Resp/CV/GI//MS/Neuro/Skin/Heme-Lymph-Imm. Imaging:  I have reviewed the below testing personally:    Echo 1/17/19  Summary  Left ventricle - mild concentric LVH, normal size and function with EF of 65%  Mitral valve - mild regurgitation. Tricuspid valve - mild regurgitation with a RVSP of 23 mmHg. SPECT 1/17/19  Summary       No EKG evidence for ischemia with exercise       Normal LV size and systolic function.       There is normal isotope uptake at stress and rest. There is no evidence of    myocardial ischemia or scar. Lipids 8/2019:  TG 38 HDL 59 LDL 66    Impression/Recommendations    Mr. Ofelia Dove is a 53 y.o. male patient with: PAF, CHADS2-VASc score= 1, not on anticoagulation  Hypertension, managed by transplant nephrologist   ESRD 2/2 FSGS, follows with Dr. Nik Fontaine, renal transplant 8/2019 at [facility]; 12/2019 stenting of transplanted kidney arterial anastomosis  STEVE, compliant with CPAP      The following cardiovascular specific medications were confirmed at this Virtual visit (14 minutes):     Aspirin 325 mg  Carvedilol 12.5 mg bid  Diltiazem 240 mg  Nifedipine 60 mg     Keven knows to call if change or concern. He remains asymptomatic from a cardiac perspective and with good risk factor control. He can be seen on annual basis at this point. Return in about 1 year (around 5/12/2021). Patient Instructions   Talk to Dr. Nik Fontaine regarding Asprin  Call if you have any palpitations/concerns regarding afib  No medication changes  Follow up in 1 year      Pursuant to the emergency declaration under the [address] [place] [address] waiver authority and the [employer] Act, this Virtual  Visit was conducted, with patient's consent, to reduce the patient's risk of exposure to COVID-19 and provide continuity of care for an established patient. Services were provided through a video synchronous discussion virtually to substitute for in-person clinic visit. Katey Parham DO, [facility]  Interventional Cardiology       [address] [place] [address]., [address]  o: [phone]      NOTE:  This report was transcribed using voice recognition software. Every effort was made to ensure accuracy; however, inadvertent computerized transcription errors may be present. Scribe's Attestation: This note was scribed in the presence of Dr. Kirill Dougherty DO by Jeanann Nyhan, MARISSA.    I, Katey Parham, have personally performed the services described in this documentation as scribed by Freedom Norwood RN in my presence, and

## 2020-09-25 ENCOUNTER — NURSE ONLY (OUTPATIENT)
Dept: PRIMARY CARE CLINIC | Age: 45
End: 2020-09-25
Payer: MEDICARE

## 2020-09-25 PROCEDURE — 90686 IIV4 VACC NO PRSV 0.5 ML IM: CPT | Performed by: INTERNAL MEDICINE

## 2020-09-25 PROCEDURE — G0008 ADMIN INFLUENZA VIRUS VAC: HCPCS | Performed by: INTERNAL MEDICINE

## 2020-09-25 NOTE — PROGRESS NOTES
Patient responses:    Have you ever had a reaction to a flu vaccine? No  Do you have any current illness? Yes and No  Have you ever had Guillian [place] Syndrome? No  Do you have a serious allergy to any of the follow: Neomycin, Polymyxin, Thimerosal, eggs or egg products? No    Flu vaccine given per order. Please see immunization tab. Risks and benefits explained. Current VIS given.

## 2021-02-22 ENCOUNTER — APPOINTMENT (OUTPATIENT)
Dept: GENERAL RADIOLOGY | Age: 46
End: 2021-02-22
Payer: MEDICARE

## 2021-02-22 ENCOUNTER — HOSPITAL ENCOUNTER (EMERGENCY)
Age: 46
Discharge: HOME | End: 2021-02-22
Payer: MEDICARE

## 2021-02-22 VITALS
OXYGEN SATURATION: 99 % | HEART RATE: 78 BPM | SYSTOLIC BLOOD PRESSURE: 145 MMHG | TEMPERATURE: 97.6 F | RESPIRATION RATE: 16 BRPM | DIASTOLIC BLOOD PRESSURE: 88 MMHG

## 2021-02-22 DIAGNOSIS — R00.2 PALPITATIONS: Primary | ICD-10-CM

## 2021-02-22 LAB
A/G RATIO: 1.4 (ref 1.1–2.2)
ALBUMIN SERPL-MCNC: 4.5 G/DL (ref 3.4–5)
ALP BLD-CCNC: 103 U/L (ref 40–129)
ALT SERPL-CCNC: 15 U/L (ref 10–40)
ANION GAP SERPL CALCULATED.3IONS-SCNC: 9 MMOL/L (ref 3–16)
AST SERPL-CCNC: 24 U/L (ref 15–37)
BASOPHILS ABSOLUTE: 0 K/UL (ref 0–0.2)
BASOPHILS RELATIVE PERCENT: 0.3 %
BILIRUB SERPL-MCNC: 0.4 MG/DL (ref 0–1)
BUN BLDV-MCNC: 21 MG/DL (ref 7–20)
CALCIUM SERPL-MCNC: 9.9 MG/DL (ref 8.3–10.6)
CHLORIDE BLD-SCNC: 108 MMOL/L (ref 99–110)
CO2: 20 MMOL/L (ref 21–32)
CREAT SERPL-MCNC: 1.4 MG/DL (ref 0.9–1.3)
EKG ATRIAL RATE: 98 BPM
EKG DIAGNOSIS: NORMAL
EKG P AXIS: 69 DEGREES
EKG P-R INTERVAL: 128 MS
EKG Q-T INTERVAL: 324 MS
EKG QRS DURATION: 78 MS
EKG QTC CALCULATION (BAZETT): 413 MS
EKG R AXIS: 54 DEGREES
EKG T AXIS: 57 DEGREES
EKG VENTRICULAR RATE: 98 BPM
EOSINOPHILS ABSOLUTE: 0 K/UL (ref 0–0.6)
EOSINOPHILS RELATIVE PERCENT: 0.6 %
GFR AFRICAN AMERICAN: >60
GFR NON-AFRICAN AMERICAN: 54
GLOBULIN: 3.3 G/DL
GLUCOSE BLD-MCNC: 92 MG/DL (ref 70–99)
HCT VFR BLD CALC: 41.8 % (ref 40.5–52.5)
HEMOGLOBIN: 13.5 G/DL (ref 13.5–17.5)
LYMPHOCYTES ABSOLUTE: 0.2 K/UL (ref 1–5.1)
LYMPHOCYTES RELATIVE PERCENT: 5.7 %
MCH RBC QN AUTO: 31.6 PG (ref 26–34)
MCHC RBC AUTO-ENTMCNC: 32.4 G/DL (ref 31–36)
MCV RBC AUTO: 97.5 FL (ref 80–100)
MONOCYTES ABSOLUTE: 0.6 K/UL (ref 0–1.3)
MONOCYTES RELATIVE PERCENT: 13.9 %
NEUTROPHILS ABSOLUTE: 3.4 K/UL (ref 1.7–7.7)
NEUTROPHILS RELATIVE PERCENT: 79.5 %
PDW BLD-RTO: 12.2 % (ref 12.4–15.4)
PLATELET # BLD: 261 K/UL (ref 135–450)
PMV BLD AUTO: 7.3 FL (ref 5–10.5)
POTASSIUM SERPL-SCNC: 3.7 MMOL/L (ref 3.5–5.1)
PRO-BNP: 161 PG/ML (ref 0–124)
RBC # BLD: 4.29 M/UL (ref 4.2–5.9)
SODIUM BLD-SCNC: 137 MMOL/L (ref 136–145)
TOTAL PROTEIN: 7.8 G/DL (ref 6.4–8.2)
TROPONIN: <0.01 NG/ML
WBC # BLD: 4.3 K/UL (ref 4–11)

## 2021-02-22 PROCEDURE — 36415 COLL VENOUS BLD VENIPUNCTURE: CPT

## 2021-02-22 PROCEDURE — 93005 ELECTROCARDIOGRAM TRACING: CPT

## 2021-02-22 PROCEDURE — 83880 ASSAY OF NATRIURETIC PEPTIDE: CPT

## 2021-02-22 PROCEDURE — 71045 X-RAY EXAM CHEST 1 VIEW: CPT

## 2021-02-22 PROCEDURE — 85025 COMPLETE CBC W/AUTO DIFF WBC: CPT

## 2021-02-22 PROCEDURE — 80053 COMPREHEN METABOLIC PANEL: CPT

## 2021-02-22 PROCEDURE — 84484 ASSAY OF TROPONIN QUANT: CPT

## 2021-02-22 PROCEDURE — 99283 EMERGENCY DEPT VISIT LOW MDM: CPT

## 2021-02-22 PROCEDURE — 93010 ELECTROCARDIOGRAM REPORT: CPT | Performed by: INTERNAL MEDICINE

## 2021-02-22 ASSESSMENT — ENCOUNTER SYMPTOMS
NAUSEA: 0
ABDOMINAL PAIN: 0
DIARRHEA: 0
SHORTNESS OF BREATH: 1
VOMITING: 0
CHEST TIGHTNESS: 0

## 2021-02-22 NOTE — ED PROVIDER NOTES
[address] [facility]        Pt Name: Suma Aguilar  MRN: [id number]  [place] 1975  Date of evaluation: 2/22/2021  Provider: MELISSA Rivera CNP  PCP: Addy Carmen MD     I have seen and evaluated this patient with my supervising physician [address] [facility]       Chief Complaint   Patient presents with    Palpitations     pt. with c/o heart racing and palpitations at times. he said they come and go and when they do he at times has SOB and visual disturbances. pt. states he is a kidney transplant pt. HISTORY OF PRESENT ILLNESS   (Location, Timing/Onset, Context/Setting, Quality, Duration, Modifying Factors, Severity, Associated Signs and Symptoms)  Note limiting factors. Suma Aguilar is a 50 y.o. male presents to the emergency department complaining of palpitations that occurred this evening with shortness of breath. The patient reports that he saw his heart beating in his chest and abdomen this evening and \"knew there was something wrong\". He reports the palpitations and shortness of breath occurred/lasted for about 15 minutes, subsided as soon as he arrived to the emergency department. Patient reports history of atrial fibrillation that was paroxysmal, states that he no longer takes an aspirin since his renal transplant. Patient denies any chest pain with complaint. Reports that he is asymptomatic at this point. States that his kidney is working well and he is making urine. He denies back or abdominal pain. No fevers, nausea vomiting diarrhea or constipation. Patient does appear otherwise well. Nursing Notes were all reviewed and agreed with or any disagreements were addressed in the HPI. REVIEW OF SYSTEMS    (2-9 systems for level 4, 10 or more for level 5)     Review of Systems   Constitutional: Negative for activity change, chills and fever. Respiratory: Positive for shortness of breath. Negative for chest tightness. Cardiovascular: Positive for palpitations. Negative for chest pain. Gastrointestinal: Negative for abdominal pain, diarrhea, nausea and vomiting. Genitourinary: Negative for dysuria. All other systems reviewed and are negative. Positives and Pertinent negatives as per HPI. Except as noted above in the ROS, all other systems were reviewed and negative.        PAST MEDICAL HISTORY     Past Medical History:   Diagnosis Date    CKD (chronic kidney disease), stage III 2012    HTN (hypertension) 2012    Obstructive sleep apnea syndrome          SURGICAL HISTORY     Past Surgical History:   Procedure Laterality Date    DIALYSIS FISTULA CREATION Right     cephalic AV fistula         CURRENTMEDICATIONS       Discharge Medication List as of 2021  2:04 AM      CONTINUE these medications which have NOT CHANGED    Details   carvedilol (COREG) 12.5 MG tablet Take 12.5 mg by mouth 2 times daily (with meals)Historical Med      magnesium oxide (MAG-OX) 400 MG tablet Take 400 mg by mouth dailyHistorical Med      sulfamethoxazole-trimethoprim (BACTRIM;SEPTRA) 400-80 MG per tablet Take 1 tablet by mouth 1 tab MWFHistorical Med      diltiazem (CARDIZEM CD) 120 MG extended release capsule Take 1 capsule by mouth daily, Disp-30 capsule, R-3Print               ALLERGIES     Pollen extract    FAMILYHISTORY       Family History   Problem Relation Age of Onset    High Blood Pressure Mother     High Blood Pressure Sister           SOCIAL HISTORY       Social History     Tobacco Use    Smoking status: Former Smoker     Packs/day: 0.00     Quit date: 2013     Years since quittin.6    Smokeless tobacco: Former User    Tobacco comment: quit date is 2013   Substance Use Topics    Alcohol use: Not Currently     Comment: occ    Drug use: No       SCREENINGS    Franklin Coma Scale  Eye Opening: Spontaneous Best Verbal Response: Oriented  Best Motor Response: Obeys commands  Ella Coma Scale Score: 15        PHYSICAL EXAM    (up to 7 for level 4, 8 or more for level 5)     ED Triage Vitals   BP Temp Temp Source Pulse Resp SpO2 Height Weight   02/22/21 0014 02/22/21 0028 02/22/21 0028 02/22/21 0014 02/22/21 0014 02/22/21 0027 -- --   (!) 154/86 97.6 °F (36.4 °C) Oral 99 9 99 %         Physical Exam  Vitals signs and nursing note reviewed. Constitutional:       Appearance: He is well-developed. He is not diaphoretic. HENT:      Head: Normocephalic and atraumatic. Right Ear: External ear normal.      Left Ear: External ear normal.   Eyes:      General:         Right eye: No discharge. Left eye: No discharge. Neck:      Musculoskeletal: Normal range of motion and neck supple. Vascular: No JVD. Cardiovascular:      Rate and Rhythm: Normal rate and regular rhythm. Pulses: Normal pulses. Heart sounds: Normal heart sounds. Pulmonary:      Effort: Pulmonary effort is normal. No respiratory distress. Breath sounds: Normal breath sounds. Abdominal:      Palpations: Abdomen is soft. Musculoskeletal: Normal range of motion. Skin:     General: Skin is warm and dry. Coloration: Skin is not pale. Neurological:      Mental Status: He is alert and oriented to person, place, and time.    Psychiatric:         Behavior: Behavior normal.         DIAGNOSTIC RESULTS   LABS:    Labs Reviewed   CBC WITH AUTO DIFFERENTIAL - Abnormal; Notable for the following components:       Result Value    RDW 12.2 (*)     Lymphocytes Absolute 0.2 (*)     All other components within normal limits    Narrative:     Performed at:  [facility] [address], [place], [facility] [address]   Phone [phone]   COMPREHENSIVE METABOLIC PANEL - Abnormal; Notable for the following components:    CO2 20 (*)     BUN 21 (*)     CREATININE 1.4 (*)     GFR Non- 54 (*) All other components within normal limits    Narrative:     Performed at:  [facility]  [address] [facility],  [place], [address]   Phone [phone]   BRAIN NATRIURETIC PEPTIDE - Abnormal; Notable for the following components:    Pro- (*)     All other components within normal limits    Narrative:     Performed at:  [facility]  [address] [facility],  [place], [address]   Phone [phone]   TROPONIN    Narrative:     Performed at:  [facility]  [address] [facility],  [place], [address]   Phone [phone]       All other labs were within normal range or not returned as of this dictation. EKG: All EKG's are interpreted by the Emergency Department Physician in the absence of a cardiologist.  Please see their note for interpretation of EKG. RADIOLOGY:   Non-plain film images such as CT, Ultrasound and MRI are read by the radiologist. Plain radiographic images are visualized and preliminarily interpreted by the ED Provider with the below findings:        Interpretation per the Radiologist below, if available at the time of this note:    XR CHEST PORTABLE   Final Result   1. No acute cardiopulmonary disease. No results found. PROCEDURES   Unless otherwise noted below, none     Procedures    CRITICAL CARE TIME   The total critical care time spent while evaluating and treating this patient was at least 41 minutes. This excludes time spent doing separately billable procedures. This includes time at the bedside, data interpretation, medication management, obtaining critical history from collateral sources if the patient is unable to provide it directly, and physician consultation. Specifics of interventions taken and potentially life-threatening diagnostic considerations are listed above in the medical decision making.       CONSULTS:  None EMERGENCY DEPARTMENT COURSE and DIFFERENTIAL DIAGNOSIS/MDM:   Vitals:    Vitals:    02/22/21 0014 02/22/21 0027 02/22/21 0028 02/22/21 0143   BP: (!) 154/86   (!) 145/88   Pulse: 99 91  78   Resp: 9 20 16 16   Temp:   97.6 °F (36.4 °C)    TempSrc:   Oral    SpO2:  99%  99%       Patient was given the following medications:  Medications - No data to display        Briefly, this is a 50year old male that presents to the emergency department complaining of palpitations that occurred this evening with shortness of breath. The patient reports that he saw his heart beating in his chest and abdomen this evening and \"knew there was something wrong\". He reports the palpitations and shortness of breath occurred/lasted for about 15 minutes, subsided as soon as he arrived to the emergency department. Patient reports history of atrial fibrillation that was paroxysmal, states that he no longer takes an aspirin since his renal transplant. Renal function shows bun 21, creatinine 1.4, and gfr >60, otherwise unremarkable labs. XR CHEST PORTABLE (Final result)  Result time 02/22/21 01:16:59  Final result by Ny Ellsworth MD (02/22/21 01:16:59)                Impression:    1. No acute cardiopulmonary disease. He will be discharged home with holter monitor order and follow up with cardiology. Return for any new or worsening symptoms. The patient has been evaluated and the history and physical exam suggest a benign etiology. I see nothing to suggest acute coronary syndrome, myocardial infarction, pulmonary embolism, thoracic aortic dissection, significant pericarditis, pneumonia, pneumothorax, or acute abdomen. I feel the patient can be safely discharged to home with outpatient follow up. Instructions have been given for the patient to return to the Emergency Department for any worsening of the symptoms, including but not limited to increased pain, shortness of breath, abdominal pain or weakness. FINAL IMPRESSION      1.  Palpitations          DISPOSITION/PLAN   DISPOSITION Decision To Discharge 02/22/2021 02:01:58 AM      PATIENT REFERREDTO:  Nolvia Mo MD  [address] [facility]  [phone]    Schedule an appointment as soon as possible for a visit         DISCHARGE MEDICATIONS:  Discharge Medication List as of 2/22/2021  2:04 AM          DISCONTINUED MEDICATIONS:  Discharge Medication List as of 2/22/2021  2:04 AM                 (Please note that portions of this note were completed with a voice recognition program.  Efforts were made to edit the dictations but occasionally words are mis-transcribed.)    MELISSA Chan CNP (electronically signed)           MELISSA Chan CNP  02/22/21 [id number]

## 2021-02-22 NOTE — ED PROVIDER NOTES
As physician-in-triage, I performed a medical screening history and physical exam on Nils Abdi II. I also cared for and evaluated the patient in conjunction with the ED Advanced Practice Provider. All diagnostic, treatment, and disposition decisions were made by myself in conjunction with the advanced practice provider. For all further details of the patient's emergency department visit, please see the advanced practice provider's documentation. Patient presents to the ER for evaluation of palpitations history of proximal atrial fibrillation renal transplant, compliant with amino suppressive medications. No active chest pain pressure acute shortness breath no hypoxia. No pulse deficit. Normal respiration no murmur.   Outpatient follow-up with his cardiologist, event monitor    Impressions: Palpitations, renal transplant,      June Segovia MD  32/89/70 2210       June Segovia MD  61/15/98 7639

## 2021-02-22 NOTE — ED NOTES
Discharge instructions provided to patient by RN at bedside. No further concerns addressed at this time.      Suzanna Hendricks RN  02/22/21 [id number]

## 2021-02-24 ENCOUNTER — HOSPITAL ENCOUNTER (OUTPATIENT)
Dept: NON INVASIVE DIAGNOSTICS | Age: 46
Discharge: HOME | End: 2021-02-24
Payer: MEDICARE

## 2021-02-24 DIAGNOSIS — R00.2 PALPITATIONS: ICD-10-CM

## 2021-02-24 PROCEDURE — 93226 XTRNL ECG REC<48 HR SCAN A/R: CPT

## 2021-02-24 PROCEDURE — 93225 XTRNL ECG REC<48 HRS REC: CPT

## 2021-03-02 ENCOUNTER — TELEPHONE (OUTPATIENT)
Dept: CARDIOLOGY CLINIC | Age: 46
End: 2021-03-02

## 2021-03-02 LAB
ACQUISITION DURATION: NORMAL S
AVERAGE HEART RATE: 77 BPM
EKG DIAGNOSIS: NORMAL
HOLTER MAX HEART RATE: 122 BPM
HOOKUP DATE: NORMAL
HOOKUP TIME: NORMAL
MAX HEART RATE TIME/DATE: NORMAL
MIN HEART RATE TIME/DATE: NORMAL
MIN HEART RATE: 60 BPM
NUMBER OF QRS COMPLEXES: NORMAL
NUMBER OF SUPRAVENTRICULAR BEATS IN RUNS: 0
NUMBER OF SUPRAVENTRICULAR COUPLETS: 2
NUMBER OF SUPRAVENTRICULAR ECTOPICS: 56
NUMBER OF SUPRAVENTRICULAR ISOLATED BEATS: 52
NUMBER OF SUPRAVENTRICULAR RUNS: 0
NUMBER OF VENTRICULAR BEATS IN RUNS: 0
NUMBER OF VENTRICULAR BIGEMINAL CYCLES: 0
NUMBER OF VENTRICULAR COUPLETS: 0
NUMBER OF VENTRICULAR ECTOPICS: 1
NUMBER OF VENTRICULAR ISOLATED BEATS: 1
NUMBER OF VENTRICULAR RUNS: 0

## 2021-03-02 NOTE — TELEPHONE ENCOUNTER
Ramila Otoole was woken up this morning at 0300 and symptoms continued until around 0500. He states that \"he can't do a lot\" when this occurs, and he feels fatigued. He also feels his heart is beating \"hard and fast.\" He has noticed it often occurs early in the mornings and wakes him up. Position changes such as sitting up, drinking water does not help. Reports it feels like when he went into afib back in 2017. He did complete a 48 hour holter monitor after recent ER visit and states  he did have a  less significant episode while wearing monitor, but it was not nearly as strong as this morning. Currently ordered for Carvedilol 12.5 BID and Diltiazem 240 daily. Also on Nifedipine 90 mg daily per his report. He states he feels Carvedilol may have contributed to low BPs and he spoke with his nephrologist about weaning off medication. He started only taking once daily 2 weeks ago but in the meantime started having palpitations. He states he was worried Carvedilol contributed to symptoms. Has not taken Carvedilol for several days because episodes continued even on QOD routine. He does not feel he needs to go back to ER, but is agreeable to see NP tomorrow. Scheduled.

## 2021-03-02 NOTE — TELEPHONE ENCOUNTER
Pt calling he has been feeling heart palpitations mostly at night with elevated heart rate and drop in blood pressure he has not been feeling normal and would like BNN to let him know the 48 hour heart monitor results. Moved his appt up from May to 03/16 pls advise if need to be seen sooner?  Thank you

## 2021-03-02 NOTE — PROGRESS NOTES
Holter monitor placed: 2/24/21  Holter monitor analyzed: 3/1/21     Min HR: 60 bpm  Max HR: 122 bpm  Rare PVC  Occasional PAC  Patient Diary not returned. Report stored into [employer] for Cardiologist to read.

## 2021-03-02 NOTE — TELEPHONE ENCOUNTER
I looks like his monitor was not one of ours. He stated that his HR was around 100 at rest. Feels like BP was dropping. Eyes were blurry. Fast beats could feel all over his body. He stated stopped the 81mg asa.

## 2021-03-03 ENCOUNTER — OFFICE VISIT (OUTPATIENT)
Dept: CARDIOLOGY CLINIC | Age: 46
End: 2021-03-03
Payer: MEDICARE

## 2021-03-03 DIAGNOSIS — I10 ESSENTIAL HYPERTENSION: ICD-10-CM

## 2021-03-03 DIAGNOSIS — I48.0 PAROXYSMAL ATRIAL FIBRILLATION (HCC): Primary | Chronic | ICD-10-CM

## 2021-03-03 DIAGNOSIS — G47.33 OBSTRUCTIVE SLEEP APNEA SYNDROME: ICD-10-CM

## 2021-03-03 PROCEDURE — 99214 OFFICE O/P EST MOD 30 MIN: CPT | Performed by: NURSE PRACTITIONER

## 2021-03-03 PROCEDURE — 93228 REMOTE 30 DAY ECG REV/REPORT: CPT | Performed by: INTERNAL MEDICINE

## 2021-03-03 PROCEDURE — G8482 FLU IMMUNIZE ORDER/ADMIN: HCPCS | Performed by: NURSE PRACTITIONER

## 2021-03-03 PROCEDURE — G8427 DOCREV CUR MEDS BY ELIG CLIN: HCPCS | Performed by: NURSE PRACTITIONER

## 2021-03-03 PROCEDURE — G8420 CALC BMI NORM PARAMETERS: HCPCS | Performed by: NURSE PRACTITIONER

## 2021-03-03 PROCEDURE — 93000 ELECTROCARDIOGRAM COMPLETE: CPT | Performed by: NURSE PRACTITIONER

## 2021-03-03 PROCEDURE — 1036F TOBACCO NON-USER: CPT | Performed by: NURSE PRACTITIONER

## 2021-03-03 RX ORDER — TACROLIMUS 1 MG/1
CAPSULE ORAL
COMMUNITY
Start: 2020-12-04

## 2021-03-03 RX ORDER — NIFEDIPINE 90 MG/1
90 TABLET, EXTENDED RELEASE ORAL DAILY
Status: ON HOLD | COMMUNITY
End: 2022-03-23

## 2021-03-03 RX ORDER — PREDNISONE 1 MG/1
5 TABLET ORAL DAILY
COMMUNITY
Start: 2020-10-28

## 2021-03-03 RX ORDER — DILTIAZEM HYDROCHLORIDE 240 MG/1
240 CAPSULE, EXTENDED RELEASE ORAL DAILY
Status: ON HOLD | COMMUNITY
Start: 2020-11-03 | End: 2022-03-23

## 2021-03-03 RX ORDER — MYCOPHENOLATE MOFETIL 250 MG/1
750 CAPSULE ORAL 2 TIMES DAILY
COMMUNITY

## 2021-03-03 NOTE — PROGRESS NOTES
[address]     Outpatient Follow Up Note      CHIEF COMPLAINT / HPI:  Follow Up secondary to palpitations     Subjective:   Gian García II is 50 y.o. male who presents today with a history of HTN, symptomatic paroxysmal atrial fibrillation (first dx in 8/2017), Renal transplant follows with Dr. Layne Garcia who manages hypertension. Pt was seen in ER for c/o palpitations 2/22/21. Had holter monitor placed for 6 days that showed: Min HR: 60 bpm, Max HR: 122 bpm, Rare PVC, Occasional PAC. Strips are not available for review. Reports before going into ER his heart was beating fast and hard. This feels similar to how he felt when he was diagnosed with atrial fibrillation in '17. Symptoms associated with him feeling like his BP is dropping, eyes would get hazy (almost seeing double), but when BP checked it was ~130/80. He thought maybe his Coreg was contributing to his symptoms so he decreased it to once daily without any improvement. Symptoms would wake him up out of sleep. This last occurred Monday morning that lasted for two hours. Episodes are associated with fatigue. HR during episode is about  (ususally he is 70s). Episodes not associated with SOB or CP. This started in January and occurs 2x weekly lasting for avg of 30-45 min at length. He doesn't notice any aggravating or relieving factors. Unfortunatley, when pt had his Holter monitor on he did not experience these symptoms. He denies significant chest pain. There is no SOB/RICHARDSON. The patient denies orthopnea/PND. Denies swelling and his weight is stable. The patient is not experiencing dizziness. With regard to medication therapy the patient has been compliant with prescribed regimen. They have tolerated therapy to date.      Past Medical History:   Diagnosis Date    CKD (chronic kidney disease), stage III 4/20/2012    HTN (hypertension) 4/20/2012    Obstructive sleep apnea syndrome      Social History:    Social History Tobacco Use   Smoking Status Former Smoker    Packs/day: 0.00    Quit date: 2013    Years since quittin.6   Smokeless Tobacco Former User   Tobacco Comment    quit date is 2013     Current Medications:  Current Outpatient Medications   Medication Sig Dispense Refill    carvedilol (COREG) 12.5 MG tablet Take 12.5 mg by mouth 2 times daily (with meals)      magnesium oxide (MAG-OX) 400 MG tablet Take 400 mg by mouth daily      sulfamethoxazole-trimethoprim (BACTRIM;SEPTRA) 400-80 MG per tablet Take 1 tablet by mouth 1 tab MWF      diltiazem (CARDIZEM CD) 120 MG extended release capsule Take 1 capsule by mouth daily (Patient taking differently: Take 240 mg by mouth daily ) 30 capsule 3     No current facility-administered medications for this visit. REVIEW OF SYSTEMS:    CONSTITUTIONAL: No major weight gain or loss, night sweats, fever, fatigue, or weakness. HEENT: No new vision difficulties or ringing in the ears. RESPIRATORY: No new SOB, PND, orthopnea or cough. CARDIOVASCULAR: See HPI  GI: No N/V/D, constipation, or abdominal pain. No black/tarry stools  : No urinary urgency, incontinence, or hematuria. SKIN: No cyanosis or skin lesions. MUSCULOSKELETAL: No new muscle or joint pain. NEUROLOGICAL: No syncope or TIA-like symptoms. PSYCHIATRIC: No anxiety, pain, insomnia or depression    Objective:   PHYSICAL EXAM:        Vitals:    21 1304 21 1318   BP: 138/88 124/78   Site: Left Upper Arm    Position: Sitting    Cuff Size: Medium Adult    Pulse: 83    SpO2: 95%    Weight: 158 lb (71.7 kg)    Height: 5' 8\" (1.727 m)       VITALS:  /78   Pulse 83   Ht 5' 8\" (1.727 m)   Wt 158 lb (71.7 kg)   SpO2 95%   BMI 24.02 kg/m²   CONSTITUTIONAL: Cooperative, no apparent distress, and appears well nourished / developed  NEUROLOGIC:  Awake and orientated to person, place, and time. PSYCH: Calm affect. SKIN: Warm and dry.   HEENT: Sclera non-icteric, normocephalic, neck supple. RESPIRATORY:  No increased work of breathing and clear to auscultation, no crackles or wheezing. CARDIOVASCULAR:  Regular rate and rhythm without murmur. Normal S1 and S2. No gallops or rubs. Normal PMI. No elevation of JVP. Normal carotid pulses with no bruits. GI:  Normal bowel sounds. Non-distended. Non-tender to palpation  EXT: No edema. No calf tenderness. Pulses are present bilaterally. DATA:    Lab Results   Component Value Date    ALT 15 02/22/2021    AST 24 02/22/2021    ALKPHOS 103 02/22/2021    BILITOT 0.4 02/22/2021     Lab Results   Component Value Date    CREATININE 1.4 (H) 02/22/2021    BUN 21 (H) 02/22/2021     02/22/2021    K 3.7 02/22/2021     02/22/2021    CO2 20 (L) 02/22/2021     Lab Results   Component Value Date    TSH 1.46 08/27/2017     Lab Results   Component Value Date    WBC 4.3 02/22/2021    HGB 13.5 02/22/2021    HCT 41.8 02/22/2021    MCV 97.5 02/22/2021     02/22/2021     No components found for: CHLPL  Lab Results   Component Value Date    TRIG 87 02/15/2017     Lab Results   Component Value Date    HDL 41 02/15/2017     Lab Results   Component Value Date    LDLCALC 90 02/15/2017     Lab Results   Component Value Date    LABVLDL 17 02/15/2017      No results found for: BNP  Radiology Review:  Pertinent images / reports were reviewed as a part of this visit and reveals the following:    Last Echo: 1/17/19  Summary  Left ventricle - mild concentric LVH, normal size and function with EF of   65%  Mitral valve - mild regurgitation. Tricuspid valve - mild regurgitation with a RVSP of 23 mmHg. Last Stress Test: 1/17/19  Summary       No EKG evidence for ischemia with exercise       Normal LV size and systolic function.       There is normal isotope uptake at stress and rest. There is no evidence of    myocardial ischemia or scar.      Holter Monitor placed by ER  Holter monitor placed: 2/24/21  Holter monitor analyzed: 3/1/21  Min with us. The patient is not currently smoking. The risks related to smoking were reviewed with the patient. Recommend maintaining a smoke-free lifestyle. Patient is on a beta-blocker  Patient is not on an ACE / ARB ; neg CHF  Patient is not on a statin ; neg CAD     Antiplatelet therapy has been recommended / prescribed for this patient. Education conducted on adverse reactions including bleeding were discussed. Discussed exercise: 30min of sustained cardiovascular exercise most days of the week   Discussed Low saturated fat / Cholesterol diet.      Thank you for allowing us to participate in the care of Brittany CORTES  [facility]   Office: [phone]    Documentation of today's visit sent to PCP

## 2021-03-07 VITALS
BODY MASS INDEX: 23.95 KG/M2 | HEIGHT: 68 IN | SYSTOLIC BLOOD PRESSURE: 124 MMHG | DIASTOLIC BLOOD PRESSURE: 78 MMHG | WEIGHT: 158 LBS | OXYGEN SATURATION: 95 % | HEART RATE: 83 BPM

## 2021-03-17 ENCOUNTER — TELEPHONE (OUTPATIENT)
Dept: CARDIOLOGY | Age: 46
End: 2021-03-17

## 2021-04-02 NOTE — PROGRESS NOTES
2021    PATIENT: Maria Luisa Thurman  : 1975    Primary Care Provider:   Enoch Ganser, MD  H:[phone]  M:[phone]    Reason for evaluation:   Chief Complaint   Patient presents with    Irregular Heart Beat    Other     BP dropping     History of present illness:   Mr. Maria Luisa Thurman is a 50 y.o. male patient here for close cardiovascular follow up for palpitations and history of PAF. Janice Bradford called into the office in March following ER visit for strong palpitations that woke him from his sleep. He states that they did not last long and have not recurred since wearing multiple monitors. He reports waking up from sleep with palpitations that reminded him of 2017 when he presented to the ER and was found to be in atrial fibrillation with RVR (first discovered on hemodialysis). He is currently wearing his second monitor and has had no atrial fibrillation detected. He has had various PVC burden without symptom correlation. His antihypertensives have been managed by Dr. Laura Jasso, given his history of renal transplant. He had two cellular rejections in . Carvedilol was taken to 6.255 mg for concerns of symptomatic hypotension. It is back to 12.5 mg bid and he reports blood pressures to be in range. He states he can still have \"feelings of blood pressure dropping even while sitting\". No chest pain, shortness of breath, syncope. Drinks 5-6 bottles of water daily.       Medical History:      Diagnosis Date    CKD (chronic kidney disease), stage III 2012    HTN (hypertension) 2012    Obstructive sleep apnea syndrome        Surgical History:      Procedure Laterality Date    DIALYSIS FISTULA CREATION Right     cephalic AV fistula       Social History:  Social History     Socioeconomic History    Marital status:      Spouse name: Not on file    Number of children: Not on file    Years of education: Not on file   [facility] Highest education level: Not on file   Occupational History    Not on file   Social Needs    Financial resource strain: Not on file    Food insecurity     Worry: Not on file     Inability: Not on file    Transportation needs     Medical: Not on file     Non-medical: Not on file   Tobacco Use    Smoking status: Former Smoker     Packs/day: 0.00     Quit date: 2013     Years since quittin.7    Smokeless tobacco: Former User    Tobacco comment: quit date is 2013   Substance and Sexual Activity    Alcohol use: Not Currently     Comment: occ    Drug use: No    Sexual activity: Yes     Partners: Female   Lifestyle    Physical activity     Days per week: Not on file     Minutes per session: Not on file    Stress: Not on file   Relationships    Social connections     Talks on phone: Not on file     Gets together: Not on file     Attends Orthodox service: Not on file     Active member of club or organization: Not on file     Attends meetings of clubs or organizations: Not on file     Relationship status: Not on file    Intimate partner violence     Fear of current or ex partner: Not on file     Emotionally abused: Not on file     Physically abused: Not on file     Forced sexual activity: Not on file   Other Topics Concern    Not on file   Social History Narrative    Not on file        Family History:  No evidence for sudden cardiac death or premature CAD. Problem Relation Age of Onset    High Blood Pressure Mother     High Blood Pressure Sister        Medications:  [x] Medications and dosages reviewed. Prior to Admission medications    Medication Sig Start Date End Date Taking?  Authorizing Provider   tacrolimus (PROGRAF) 1 MG capsule 3 caps AM, 3 caps PM 20  Yes Historical Provider, MD   dilTIAZem (TIAZAC) 240 MG extended release capsule Take 240 mg by mouth daily 11/3/20  Yes Historical Provider, MD   NIFEdipine (PROCARDIA XL) 90 MG extended release tablet Take 90 mg by 02/24/2021 397047     Number Of Ventricular Ec* 02/24/2021 1     Number Of Ventricular Is* 02/24/2021 1     Number Of Ventricular Bi* 02/24/2021 0     Number Of Ventricular Co* 02/24/2021 0     Number Of Ventricular Ru* 02/24/2021 0     Number Of Ventricular Be* 02/24/2021 0     Number Of Superventricul* 02/24/2021 56     Number Of Suptaventricul* 02/24/2021 52     Number Of Supraventricul* 02/24/2021 2     Number Of Supraventricul* 02/24/2021 0     Number Of Supraventricul* 02/24/2021 0     Average Heart Rate 02/24/2021 77     Holter Max Heart Rate 02/24/2021 122     Min Heart Rate 02/24/2021 60     Max Heart Rate Time/Date 02/24/2021 [id number]     Min Heart Rate Time/Date 02/24/2021 [id number]     Diagnosis 02/24/2021 Sinus RhythmMinimum Heart Rate: 60 beats per minuteMaximum Heart Rate: 122 beats per minuteOccassional Premature Atrial Complexes. Rare Premature Ventricular Complexes. Patient Diary not returned. Confirmed by Lara Garrett ([id number]) on 3/2/2021 11:06:38 AM    Admission on 02/22/2021, Discharged on 02/22/2021   Component Date Value    Ventricular Rate 02/22/2021 98     Atrial Rate 02/22/2021 98     P-R Interval 02/22/2021 128     QRS Duration 02/22/2021 78     Q-T Interval 02/22/2021 324     QTc Calculation (Bazett) 02/22/2021 413     P Axis 02/22/2021 69     R Axis 02/22/2021 54     T Axis 02/22/2021 57     Diagnosis 02/22/2021 Sinus rhythm with occasional Premature ventricular complexesOtherwise normal ECGConfirmed by Gricelda Moy MD, [facility] ([id number]) on 2/22/2021 4:56:04 PM     WBC 02/22/2021 4.3     RBC 02/22/2021 4.29     Hemoglobin 02/22/2021 13.5     Hematocrit 02/22/2021 41.8     MCV 02/22/2021 97.5     MCH 02/22/2021 31.6     MCHC 02/22/2021 32.4     RDW 02/22/2021 12.2*    Platelets 18/48/8838 261     MPV 02/22/2021 7.3     Neutrophils % 02/22/2021 79.5     Lymphocytes % 02/22/2021 5.7     Monocytes % 02/22/2021 13.9     Eosinophils % 02/22/2021 0.6     Basophils % 02/22/2021 0.3     Neutrophils Absolute 02/22/2021 3.4     Lymphocytes Absolute 02/22/2021 0.2*    Monocytes Absolute 02/22/2021 0.6     Eosinophils Absolute 02/22/2021 0.0     Basophils Absolute 02/22/2021 0.0     Sodium 02/22/2021 137     Potassium 02/22/2021 3.7     Chloride 02/22/2021 108     CO2 02/22/2021 20*    Anion Gap 02/22/2021 9     Glucose 02/22/2021 92     BUN 02/22/2021 21*    CREATININE 02/22/2021 1.4*    GFR Non- 02/22/2021 54*    GFR  02/22/2021 >60     Calcium 02/22/2021 9.9     Total Protein 02/22/2021 7.8     Albumin 02/22/2021 4.5     Albumin/Globulin Ratio 02/22/2021 1.4     Total Bilirubin 02/22/2021 0.4     Alkaline Phosphatase 02/22/2021 103     ALT 02/22/2021 15     AST 02/22/2021 24     Globulin 02/22/2021 3.3     Troponin 02/22/2021 <0.01     Pro-BNP 02/22/2021 161*       Imaging:  I have reviewed the below testing personally:    SPECT 1/17/19  No EKG evidence for ischemia with exercise     Normal LV size and systolic function. There is normal isotope uptake at stress and rest.   There is no evidence of  myocardial ischemia or scar. Echo 1/17/19  Summary  Left ventricle - mild concentric LVH, normal size and function with EF of 65%  Mitral valve - mild regurgitation. Tricuspid valve - mild regurgitation with a RVSP of 23 mmHg. Holter monitor 2/2021: SR Min HR: 60 bpm, Max HR: 122 bpm, Rare PVC, Occasional PAC, no diary returned    EKG 3/3/21: SR, 75 bpm    Labs 3/19/21  TSH: 1.55  K: 4.2  Magnesium: 1.7  Hemoglobin: 14.3  Hematocrit: 44.2    Lipids 2/2021:  TG 83 HDL 62        MCOT 3/3/21 preliminary - still in process   SR Avg HR 71   5% PVCs, 1% PACs  Rare symptoms with sinus       Impression/Recommendations    Mr. Federico Kendrick is a 50 y.o. male patient with:    Palpitations  Lightheadedness  PAF, CHADS2-VASc score= 1, not on anticoagulation  Hypertension, controlled   ESRD, follows with Dr. Tawanda Meng, renal transplant 2019 ([facility])  STEVE, compliant with CPAP  Former tobacco use (>10PYH)       Stefano Doyle is wearing his second event monitor since the emergency room visit in February 2021 for abrupt palpitations was unrevealing. Preliminary results for most recent report continue to be without atrial fibrillation. Rare symptoms are during sinus. He is with new PVC burden. He is agreeable to updating 2019 stress test and echocardiogram prior to potential Electrophysiology referral.      No change to regimen of :     Aspirin 325 mg   Carvedilol 12.5 mg bid   Diltiazem 240 mg  Nifedipine 90 mg     Orders Placed This Encounter   Procedures    Stress test myoview     Standing Status:   Future     Standing Expiration Date:   4/13/2022     Order Specific Question:   Reason for Exam?     Answer:   EKG abnormalities    Echo 2D w doppler w color complete     Standing Status:   Future     Standing Expiration Date:   4/13/2022     Order Specific Question:   Reason for exam:     Answer:   PVCs, palpitations, PAF     Return for Testing. Patient Instructions   Stress test and echocardiogram  Hold your Carvedilol the morning of and night before your stress test  Plan to continue annual follow up with Dr. Kenneth Higuera    Thank you for allowing me to participate in the care of your patient. Please do not hesitate to call. Ludwig Kelly DO, [facility] [place] [facility]  Interventional Cardiology     o: [phone]  [address] [place] [address], [address] [facility], [address] [place] [address]      NOTE:  This report was transcribed using voice recognition software. Every effort was made to ensure accuracy; however, inadvertent computerized transcription errors may be present. Scribe's Attestation: This note was scribed in the presence of Dr. Kenneth Higuera DO by Rj Flynn RN.    I, Ludwig Kelly, have personally performed the services described in this documentation as scribed by Yuan Norwood RN in my presence, and it is both accurate and complete.  An electronic signature was used to authenticate this note.

## 2021-04-13 ENCOUNTER — OFFICE VISIT (OUTPATIENT)
Dept: CARDIOLOGY CLINIC | Age: 46
End: 2021-04-13
Payer: MEDICARE

## 2021-04-13 VITALS
OXYGEN SATURATION: 98 % | DIASTOLIC BLOOD PRESSURE: 84 MMHG | WEIGHT: 157.7 LBS | HEIGHT: 68 IN | HEART RATE: 74 BPM | BODY MASS INDEX: 23.9 KG/M2 | SYSTOLIC BLOOD PRESSURE: 112 MMHG

## 2021-04-13 DIAGNOSIS — R94.31 ABNORMAL EKG: ICD-10-CM

## 2021-04-13 DIAGNOSIS — I49.3 PVC (PREMATURE VENTRICULAR CONTRACTION): ICD-10-CM

## 2021-04-13 DIAGNOSIS — Z94.89 TRANSPLANT RECIPIENT: ICD-10-CM

## 2021-04-13 DIAGNOSIS — G47.33 OBSTRUCTIVE SLEEP APNEA SYNDROME: ICD-10-CM

## 2021-04-13 DIAGNOSIS — I10 ESSENTIAL HYPERTENSION: Chronic | ICD-10-CM

## 2021-04-13 DIAGNOSIS — I48.0 PAROXYSMAL ATRIAL FIBRILLATION (HCC): Primary | Chronic | ICD-10-CM

## 2021-04-13 DIAGNOSIS — R00.2 PALPITATIONS: ICD-10-CM

## 2021-04-13 PROCEDURE — 99214 OFFICE O/P EST MOD 30 MIN: CPT | Performed by: INTERNAL MEDICINE

## 2021-04-13 PROCEDURE — G8427 DOCREV CUR MEDS BY ELIG CLIN: HCPCS | Performed by: INTERNAL MEDICINE

## 2021-04-13 PROCEDURE — 1036F TOBACCO NON-USER: CPT | Performed by: INTERNAL MEDICINE

## 2021-04-13 PROCEDURE — G8420 CALC BMI NORM PARAMETERS: HCPCS | Performed by: INTERNAL MEDICINE

## 2021-04-13 NOTE — PATIENT INSTRUCTIONS
Stress test and echocardiogram  Hold your Carvedilol the morning of and night before your stress test  Plan to continue annual follow up with Dr. Rommel Bingham

## 2021-04-13 NOTE — LETTER
HTN (hypertension) 2012    Obstructive sleep apnea syndrome        Surgical History:      Procedure Laterality Date    DIALYSIS FISTULA CREATION Right 6831    cephalic AV fistula       Social History:  Social History     Socioeconomic History    Marital status:      Spouse name: Not on file    Number of children: Not on file    Years of education: Not on file    Highest education level: Not on file   Occupational History    Not on file   Social Needs    Financial resource strain: Not on file    Food insecurity     Worry: Not on file     Inability: Not on file    Transportation needs     Medical: Not on file     Non-medical: Not on file   Tobacco Use    Smoking status: Former Smoker     Packs/day: 0.00     Quit date: 2013     Years since quittin.7    Smokeless tobacco: Former User    Tobacco comment: quit date is 2013   Substance and Sexual Activity    Alcohol use: Not Currently     Comment: occ    Drug use: No    Sexual activity: Yes     Partners: Female   Lifestyle    Physical activity     Days per week: Not on file     Minutes per session: Not on file    Stress: Not on file   Relationships    Social connections     Talks on phone: Not on file     Gets together: Not on file     Attends Voodoo service: Not on file     Active member of club or organization: Not on file     Attends meetings of clubs or organizations: Not on file     Relationship status: Not on file    Intimate partner violence     Fear of current or ex partner: Not on file     Emotionally abused: Not on file     Physically abused: Not on file     Forced sexual activity: Not on file   Other Topics Concern    Not on file   Social History Narrative    Not on file        Family History:  No evidence for sudden cardiac death or premature CAD.       Problem Relation Age of Onset    High Blood Pressure Mother     High Blood Pressure Sister        Medications:  [x] Medications and dosages reviewed. Prior to Admission medications    Medication Sig Start Date End Date Taking?  Authorizing Provider   tacrolimus (PROGRAF) 1 MG capsule 3 caps AM, 3 caps PM 12/4/20  Yes Historical Provider, MD   dilTIAZem (TIAZAC) 240 MG extended release capsule Take 240 mg by mouth daily 11/3/20  Yes Historical Provider, MD   NIFEdipine (PROCARDIA XL) 90 MG extended release tablet Take 90 mg by mouth daily   Yes Historical Provider, MD   Cholecalciferol (VITAMIN D3) 25 MCG (1000 UT) CAPS Take 1,000 mg by mouth daily   Yes Historical Provider, MD   predniSONE (DELTASONE) 5 MG tablet Take 5 mg by mouth daily 10/28/20  Yes Historical Provider, MD   mycophenolate (CELLCEPT) 250 MG capsule Take 750 mg by mouth 2 times daily   Yes Historical Provider, MD   carvedilol (COREG) 12.5 MG tablet Take 12.5 mg by mouth daily  3/12/20  Yes Historical Provider, MD   magnesium oxide (MAG-OX) 400 MG tablet Take 400 mg by mouth daily   Yes Historical Provider, MD       Allergies:  Pollen extract     Review of Systems:    [x]Full ROS obtained and negative except as mentioned in HPI    Physical Examination:    /84 (Site: Left Upper Arm, Position: Sitting, Cuff Size: Medium Adult)   Pulse 74   Ht 5' 8\" (1.727 m)   Wt 157 lb 11.2 oz (71.5 kg)   SpO2 98%   BMI 23.98 kg/m²   Wt Readings from Last 3 Encounters:   04/13/21 157 lb 11.2 oz (71.5 kg)   03/03/21 158 lb (71.7 kg)   05/12/20 172 lb (78 kg)     Vitals:    04/13/21 0805   BP: 112/84   Pulse: 74   SpO2: 98%       · GENERAL: Well developed, well nourished, no acute distress  · NEUROLOGICAL: Alert and oriented x3  · PSYCH: Normal mood and affect   · SKIN: Warm and dry  · HEENT: Normocephalic, atraumatic, Sclera non-icteric, mucous membranes moist  · NECK: supple, JVP normal  · CARDIAC: Normal PMI, regular rate and rhythm, normal S1S2, no murmur, rub, or gallop  · RESPIRATORY: Normal respiratory effort, clear to auscultation bilaterally  · EXTREMITIES: no edema or clubbing, +2 pulses bilaterally   · MUSCULOSKELETAL: No joint swelling or tenderness, no chest wall tenderness  · GASTROINTESTINAL: normal bowel sounds, soft, non-tender    Labs:  Lab Review   Hospital Outpatient Visit on 02/24/2021   Component Date Value    Hookup Date 02/24/2021 Feb 24 2021      Hookup Time 02/24/2021 11:26:00     Acquisition Duration 02/24/2021 [id number]     Number Of QRS Complexes 02/24/2021 200441     Number Of Ventricular Ec* 02/24/2021 1     Number Of Ventricular Is* 02/24/2021 1     Number Of Ventricular Bi* 02/24/2021 0     Number Of Ventricular Co* 02/24/2021 0     Number Of Ventricular Ru* 02/24/2021 0     Number Of Ventricular Be* 02/24/2021 0     Number Of Superventricul* 02/24/2021 56     Number Of Suptaventricul* 02/24/2021 52     Number Of Supraventricul* 02/24/2021 2     Number Of Supraventricul* 02/24/2021 0     Number Of Supraventricul* 02/24/2021 0     Average Heart Rate 02/24/2021 77     Holter Max Heart Rate 02/24/2021 122     Min Heart Rate 02/24/2021 60     Max Heart Rate Time/Date 02/24/2021 [id number]     Min Heart Rate Time/Date 02/24/2021 [id number]     Diagnosis 02/24/2021 Sinus RhythmMinimum Heart Rate: 60 beats per minuteMaximum Heart Rate: 122 beats per minuteOccassional Premature Atrial Complexes. Rare Premature Ventricular Complexes. Patient Diary not returned. Confirmed by Jorene Cooks ([id number]) on 3/2/2021 11:06:38 AM    Admission on 02/22/2021, Discharged on 02/22/2021   Component Date Value    Ventricular Rate 02/22/2021 98     Atrial Rate 02/22/2021 98     P-R Interval 02/22/2021 128     QRS Duration 02/22/2021 78     Q-T Interval 02/22/2021 324     QTc Calculation (Bazett) 02/22/2021 413     P Axis 02/22/2021 69     R Axis 02/22/2021 54     T Axis 02/22/2021 57     Diagnosis 02/22/2021 Sinus rhythm with occasional Premature ventricular complexesOtherwise normal ECGConfirmed by Bebeto Diaz MD, Aditya Boo ([id number]) on 2/22/2021 4:56:04 PM     WBC 02/22/2021 4.3     RBC 02/22/2021 4.29     Hemoglobin 02/22/2021 13.5     Hematocrit 02/22/2021 41.8     MCV 02/22/2021 97.5     MCH 02/22/2021 31.6     MCHC 02/22/2021 32.4     RDW 02/22/2021 12.2*    Platelets 51/54/6473 261     MPV 02/22/2021 7.3     Neutrophils % 02/22/2021 79.5     Lymphocytes % 02/22/2021 5.7     Monocytes % 02/22/2021 13.9     Eosinophils % 02/22/2021 0.6     Basophils % 02/22/2021 0.3     Neutrophils Absolute 02/22/2021 3.4     Lymphocytes Absolute 02/22/2021 0.2*    Monocytes Absolute 02/22/2021 0.6     Eosinophils Absolute 02/22/2021 0.0     Basophils Absolute 02/22/2021 0.0     Sodium 02/22/2021 137     Potassium 02/22/2021 3.7     Chloride 02/22/2021 108     CO2 02/22/2021 20*    Anion Gap 02/22/2021 9     Glucose 02/22/2021 92     BUN 02/22/2021 21*    CREATININE 02/22/2021 1.4*    GFR Non- 02/22/2021 54*    GFR  02/22/2021 >60     Calcium 02/22/2021 9.9     Total Protein 02/22/2021 7.8     Albumin 02/22/2021 4.5     Albumin/Globulin Ratio 02/22/2021 1.4     Total Bilirubin 02/22/2021 0.4     Alkaline Phosphatase 02/22/2021 103     ALT 02/22/2021 15     AST 02/22/2021 24     Globulin 02/22/2021 3.3     Troponin 02/22/2021 <0.01     Pro-BNP 02/22/2021 161*       Imaging:  I have reviewed the below testing personally:    SPECT 1/17/19  No EKG evidence for ischemia with exercise     Normal LV size and systolic function. There is normal isotope uptake at stress and rest.   There is no evidence of  myocardial ischemia or scar. Echo 1/17/19  Summary  Left ventricle - mild concentric LVH, normal size and function with EF of 65%  Mitral valve - mild regurgitation. Tricuspid valve - mild regurgitation with a RVSP of 23 mmHg.     Holter monitor 2/2021: SR Min HR: 60 bpm, Max HR: 122 bpm, Rare PVC, Occasional PAC, no diary returned    EKG 3/3/21: SR, 75 bpm    Labs 3/19/21  TSH: 1.55  K: 4.2  Magnesium: 1.7 Hemoglobin: 14.3  Hematocrit: 44.2    Lipids 2/2021:  TG 83 HDL 62        MCOT 3/3/21 preliminary - still in process   SR Avg HR 71   5% PVCs, 1% PACs  Rare symptoms with sinus       Impression/Recommendations    Mr. Lul Eaton is a 50 y.o. male patient with:    Palpitations  Lightheadedness  PAF, CHADS2-VASc score= 1, not on anticoagulation  Hypertension, controlled   ESRD, follows with Dr. Suleman Forte, renal transplant 2019 ([facility])  STEVE, compliant with CPAP  Former tobacco use (>10PYH)       Rodney Aiken is wearing his second event monitor since the emergency room visit in February 2021 for abrupt palpitations was unrevealing. Preliminary results for most recent report continue to be without atrial fibrillation. Rare symptoms are during sinus. He is with new PVC burden. He is agreeable to updating 2019 stress test and echocardiogram prior to potential Electrophysiology referral.      No change to regimen of :     Aspirin 325 mg   Carvedilol 12.5 mg bid   Diltiazem 240 mg  Nifedipine 90 mg     Orders Placed This Encounter   Procedures    Stress test myoview     Standing Status:   Future     Standing Expiration Date:   4/13/2022     Order Specific Question:   Reason for Exam?     Answer:   EKG abnormalities    Echo 2D w doppler w color complete     Standing Status:   Future     Standing Expiration Date:   4/13/2022     Order Specific Question:   Reason for exam:     Answer:   PVCs, palpitations, PAF     Return for Testing. Patient Instructions   Stress test and echocardiogram  Hold your Carvedilol the morning of and night before your stress test  Plan to continue annual follow up with Dr. Lauren De Oliveira    Thank you for allowing me to participate in the care of your patient. Please do not hesitate to call. Omar Kimbrough DO, [facility] [place]  Interventional Cardiology     o: [phone]  [address], [address] [facility], [address]      NOTE:  This report was transcribed using voice recognition software.   Every effort was made to ensure accuracy; however, inadvertent computerized transcription errors may be present. Scribe's Attestation: This note was scribed in the presence of Dr. Vitor Garza DO by Samy Merlos RN.    I, Krishan Maravilla, have personally performed the services described in this documentation as scribed by Marco Antonio Elizabeth. MARISSA Norwood in my presence, and it is both accurate and complete. An electronic signature was used to authenticate this note.            Sincerely,        Azalia Mandel DO

## 2021-05-03 ENCOUNTER — HOSPITAL ENCOUNTER (OUTPATIENT)
Dept: NON INVASIVE DIAGNOSTICS | Age: 46
Discharge: HOME | End: 2021-05-03
Payer: MEDICARE

## 2021-05-03 DIAGNOSIS — R94.31 ABNORMAL EKG: ICD-10-CM

## 2021-05-03 DIAGNOSIS — I49.3 PVC (PREMATURE VENTRICULAR CONTRACTION): ICD-10-CM

## 2021-05-03 DIAGNOSIS — R00.2 PALPITATIONS: ICD-10-CM

## 2021-05-03 DIAGNOSIS — I48.0 PAROXYSMAL ATRIAL FIBRILLATION (HCC): Chronic | ICD-10-CM

## 2021-05-03 DIAGNOSIS — I10 ESSENTIAL HYPERTENSION: Chronic | ICD-10-CM

## 2021-05-03 LAB
LV EF: 65 %
LV EF: 67 %
LVEF MODALITY: NORMAL
LVEF MODALITY: NORMAL

## 2021-05-03 PROCEDURE — 78452 HT MUSCLE IMAGE SPECT MULT: CPT | Performed by: INTERNAL MEDICINE

## 2021-05-03 PROCEDURE — 93306 TTE W/DOPPLER COMPLETE: CPT

## 2021-05-03 PROCEDURE — 3430000000 HC RX DIAGNOSTIC RADIOPHARMACEUTICAL: Performed by: INTERNAL MEDICINE

## 2021-05-03 PROCEDURE — A9502 TC99M TETROFOSMIN: HCPCS | Performed by: INTERNAL MEDICINE

## 2021-05-03 PROCEDURE — 93017 CV STRESS TEST TRACING ONLY: CPT | Performed by: INTERNAL MEDICINE

## 2021-05-03 RX ADMIN — TETROFOSMIN 10 MILLICURIE: 1.38 INJECTION, POWDER, LYOPHILIZED, FOR SOLUTION INTRAVENOUS at 08:41

## 2021-05-03 RX ADMIN — TETROFOSMIN 30 MILLICURIE: 1.38 INJECTION, POWDER, LYOPHILIZED, FOR SOLUTION INTRAVENOUS at 10:05

## 2021-05-03 NOTE — RESULT ENCOUNTER NOTE
Discussed stress test and echo results with [facility] following testing- he will call us if he wants to trial increased Diltiazem dosing for palpitations. Otherwise we will see him once a year.

## 2021-08-05 ENCOUNTER — HOSPITAL ENCOUNTER (EMERGENCY)
Age: 46
Discharge: HOME | End: 2021-08-05
Attending: EMERGENCY MEDICINE
Payer: MEDICARE

## 2021-08-05 ENCOUNTER — APPOINTMENT (OUTPATIENT)
Dept: GENERAL RADIOLOGY | Age: 46
End: 2021-08-05
Payer: MEDICARE

## 2021-08-05 VITALS
WEIGHT: 154 LBS | BODY MASS INDEX: 23.34 KG/M2 | OXYGEN SATURATION: 100 % | HEIGHT: 68 IN | HEART RATE: 62 BPM | SYSTOLIC BLOOD PRESSURE: 173 MMHG | RESPIRATION RATE: 16 BRPM | TEMPERATURE: 97.6 F | DIASTOLIC BLOOD PRESSURE: 94 MMHG

## 2021-08-05 DIAGNOSIS — H65.192 ACUTE EFFUSION OF LEFT EAR: ICD-10-CM

## 2021-08-05 DIAGNOSIS — R11.0 NAUSEA: Primary | ICD-10-CM

## 2021-08-05 LAB
A/G RATIO: 1.3 (ref 1.1–2.2)
ALBUMIN SERPL-MCNC: 4.4 G/DL (ref 3.4–5)
ALP BLD-CCNC: 101 U/L (ref 40–129)
ALT SERPL-CCNC: 16 U/L (ref 10–40)
ANION GAP SERPL CALCULATED.3IONS-SCNC: 8 MMOL/L (ref 3–16)
AST SERPL-CCNC: 23 U/L (ref 15–37)
BASOPHILS ABSOLUTE: 0 K/UL (ref 0–0.2)
BASOPHILS RELATIVE PERCENT: 0.3 %
BILIRUB SERPL-MCNC: 0.5 MG/DL (ref 0–1)
BILIRUBIN URINE: NEGATIVE
BLOOD, URINE: NEGATIVE
BUN BLDV-MCNC: 26 MG/DL (ref 7–20)
CALCIUM SERPL-MCNC: 9.8 MG/DL (ref 8.3–10.6)
CHLORIDE BLD-SCNC: 107 MMOL/L (ref 99–110)
CLARITY: ABNORMAL
CO2: 23 MMOL/L (ref 21–32)
COLOR: YELLOW
CREAT SERPL-MCNC: 1.6 MG/DL (ref 0.9–1.3)
EKG ATRIAL RATE: 62 BPM
EKG DIAGNOSIS: NORMAL
EKG P AXIS: 80 DEGREES
EKG P-R INTERVAL: 150 MS
EKG Q-T INTERVAL: 372 MS
EKG QRS DURATION: 88 MS
EKG QTC CALCULATION (BAZETT): 377 MS
EKG R AXIS: 75 DEGREES
EKG T AXIS: 71 DEGREES
EKG VENTRICULAR RATE: 62 BPM
EOSINOPHILS ABSOLUTE: 0 K/UL (ref 0–0.6)
EOSINOPHILS RELATIVE PERCENT: 0.6 %
GFR AFRICAN AMERICAN: 56
GFR NON-AFRICAN AMERICAN: 46
GLOBULIN: 3.4 G/DL
GLUCOSE BLD-MCNC: 124 MG/DL (ref 70–99)
GLUCOSE URINE: NEGATIVE MG/DL
HCT VFR BLD CALC: 40.8 % (ref 40.5–52.5)
HEMOGLOBIN: 13.4 G/DL (ref 13.5–17.5)
KETONES, URINE: NEGATIVE MG/DL
LEUKOCYTE ESTERASE, URINE: NEGATIVE
LIPASE: 33 U/L (ref 13–60)
LYMPHOCYTES ABSOLUTE: 0.2 K/UL (ref 1–5.1)
LYMPHOCYTES RELATIVE PERCENT: 6.1 %
MCH RBC QN AUTO: 31.7 PG (ref 26–34)
MCHC RBC AUTO-ENTMCNC: 32.8 G/DL (ref 31–36)
MCV RBC AUTO: 96.5 FL (ref 80–100)
MICROSCOPIC EXAMINATION: ABNORMAL
MONOCYTES ABSOLUTE: 0.5 K/UL (ref 0–1.3)
MONOCYTES RELATIVE PERCENT: 12.9 %
NEUTROPHILS ABSOLUTE: 3.1 K/UL (ref 1.7–7.7)
NEUTROPHILS RELATIVE PERCENT: 80.1 %
NITRITE, URINE: NEGATIVE
PDW BLD-RTO: 13.1 % (ref 12.4–15.4)
PH UA: 6 (ref 5–8)
PLATELET # BLD: 276 K/UL (ref 135–450)
PMV BLD AUTO: 7.5 FL (ref 5–10.5)
POTASSIUM REFLEX MAGNESIUM: 4.4 MMOL/L (ref 3.5–5.1)
PROTEIN UA: NEGATIVE MG/DL
RBC # BLD: 4.23 M/UL (ref 4.2–5.9)
SODIUM BLD-SCNC: 138 MMOL/L (ref 136–145)
SPECIFIC GRAVITY UA: 1.02 (ref 1–1.03)
TOTAL PROTEIN: 7.8 G/DL (ref 6.4–8.2)
TROPONIN: <0.01 NG/ML
URINE TYPE: ABNORMAL
UROBILINOGEN, URINE: 0.2 E.U./DL
WBC # BLD: 3.9 K/UL (ref 4–11)

## 2021-08-05 PROCEDURE — 96374 THER/PROPH/DIAG INJ IV PUSH: CPT

## 2021-08-05 PROCEDURE — 84484 ASSAY OF TROPONIN QUANT: CPT

## 2021-08-05 PROCEDURE — 71046 X-RAY EXAM CHEST 2 VIEWS: CPT

## 2021-08-05 PROCEDURE — 99284 EMERGENCY DEPT VISIT MOD MDM: CPT

## 2021-08-05 PROCEDURE — 93005 ELECTROCARDIOGRAM TRACING: CPT | Performed by: PHYSICIAN ASSISTANT

## 2021-08-05 PROCEDURE — 6360000002 HC RX W HCPCS: Performed by: PHYSICIAN ASSISTANT

## 2021-08-05 PROCEDURE — 80053 COMPREHEN METABOLIC PANEL: CPT

## 2021-08-05 PROCEDURE — 83690 ASSAY OF LIPASE: CPT

## 2021-08-05 PROCEDURE — 85025 COMPLETE CBC W/AUTO DIFF WBC: CPT

## 2021-08-05 PROCEDURE — 81003 URINALYSIS AUTO W/O SCOPE: CPT

## 2021-08-05 RX ORDER — ONDANSETRON 4 MG/1
4 TABLET, ORALLY DISINTEGRATING ORAL EVERY 8 HOURS PRN
Qty: 10 TABLET | Refills: 0 | Status: SHIPPED | OUTPATIENT
Start: 2021-08-05 | End: 2022-04-15

## 2021-08-05 RX ORDER — ONDANSETRON 2 MG/ML
4 INJECTION INTRAMUSCULAR; INTRAVENOUS ONCE
Status: COMPLETED | OUTPATIENT
Start: 2021-08-05 | End: 2021-08-05

## 2021-08-05 RX ORDER — FLUTICASONE PROPIONATE 50 MCG
2 SPRAY, SUSPENSION (ML) NASAL DAILY
Qty: 1 BOTTLE | Refills: 0 | Status: SHIPPED | OUTPATIENT
Start: 2021-08-05 | End: 2021-08-12 | Stop reason: SDUPTHER

## 2021-08-05 RX ADMIN — ONDANSETRON 4 MG: 2 INJECTION INTRAMUSCULAR; INTRAVENOUS at 10:04

## 2021-08-05 NOTE — ED PROVIDER NOTES
[address] ENCOUNTER          PHYSICIAN ASSISTANT NOTE       Date of evaluation: 8/5/2021    Chief Complaint     Nausea and Hypotension      History of Present Illness     Marty Abdi II is a 50 y.o. male who presents with complaints of nausea started this morning. Patient denies any changes in diet or medications. Patient reports compliance with the medications. Patient denies fever, chills, dizziness, lightheadedness, chest pain, shortness of breath, dry heaving, vomiting, abdominal pain, dysuria, hematuria, diarrhea, or constipation. Patient also notes fullness to his left ear for about a week. He notes \"ocean sounds\" from his left ear but denies any pain or drainage. With the exception of the above, there are no aggravating or alleviating factors. Review of Systems     ROS: Pertinent positive and negative findings as documented in the HPI, otherwise all other systems were reviewed and were negative. Past Medical, Surgical, Family, and Social History     He has a past medical history of CKD (chronic kidney disease), stage III ([address] HTN (hypertension), and Obstructive sleep apnea syndrome. He has a past surgical history that includes Dialysis fistula creation (Right, 06/14/2017) and Kidney transplant (08/09/2019). His family history includes High Blood Pressure in his mother and sister. He reports that he quit smoking about 8 years ago. He smoked 0.00 packs per day. He has quit using smokeless tobacco. He reports previous alcohol use. He reports that he does not use drugs.     Medications     Previous Medications    CARVEDILOL (COREG) 12.5 MG TABLET    Take 12.5 mg by mouth daily     CHOLECALCIFEROL (VITAMIN D3) 25 MCG (1000 UT) CAPS    Take 1,000 mg by mouth daily    DILTIAZEM (TIAZAC) 240 MG EXTENDED RELEASE CAPSULE    Take 240 mg by mouth daily    MAGNESIUM OXIDE (MAG-OX) 400 MG TABLET    Take 400 mg by mouth daily    MYCOPHENOLATE (CELLCEPT) 250 MG CAPSULE Take 750 mg by mouth 2 times daily    NIFEDIPINE (PROCARDIA XL) 90 MG EXTENDED RELEASE TABLET    Take 90 mg by mouth daily    PREDNISONE (DELTASONE) 5 MG TABLET    Take 5 mg by mouth daily    TACROLIMUS (PROGRAF) 1 MG CAPSULE    3 caps AM, 3 caps PM       Allergies     He is allergic to pollen extract. Physical Exam     INITIAL VITALS: BP: (!) 173/94, Temp: 97.6 °F (36.4 °C), Pulse: 62, Resp: 16, SpO2: 100 %    General: 50 y.o. male in no apparent distress, well developed, well nourished, non-toxic appearance. HEENT: Atraumatic, normocephalic. EOMs intact. Normal external ears without tenderness to palpation. Bilateral EACs nonerythematous or edematous. Left TM with mild effusion but no perforation or erythema. Right TM intact and nonerythematous. Bony landmarks and cone of light visualized. Neck:  Full range of motion. Chest/pulm: Respiratory rate normal. Speaks in complete sentences, no respiratory distress, lungs CTA bilaterally, no wheezes, rales, rhonchi. Cardiovascular: Heart rate normal. RRR, no murmurs, rubs, gallops appreciated. Abdomen: No gross distension. Soft, non-tender, non-peritoneal. No suprapubic or CVAT. : Deferred. Musculoskeletal: Ambulates without difficulty. No evident long bone or joint deformity. No lower extremity edema. Neuro: A&O x 4. Normal speech without dysarthria or aphasia. Moves all extremities spontaneously and symmetrically. Movements normal without ataxia. Skin: Warm, dry. No obvious rashes, petechiae, or purpura. Psych: Appropriate mood and affect, normal interaction.      Diagnostic Results     EKG  Seen and interpreted by myself and the attending physician  Indication: Nausea  Finding: NSR; No evidence for acute ischemia or ST segment changes compared to 03/03/21  Ventricular Rate: 62 bpm  LA Interval: 150 ms  QRS Duration: 88 ms  QT/QTc: 372/377 ms  ST Segments: no elevation or depression  T waves: no inversions    RADIOLOGY:  XR CHEST (2 VW)   Final Result   1. No acute cardiopulmonary abnormalities.    2. No interval changes             LABS:   Results for orders placed or performed during the hospital encounter of 08/05/21   CBC Auto Differential   Result Value Ref Range    WBC 3.9 (L) 4.0 - 11.0 K/uL    RBC 4.23 4.20 - 5.90 M/uL    Hemoglobin 13.4 (L) 13.5 - 17.5 g/dL    Hematocrit 40.8 40.5 - 52.5 %    MCV 96.5 80.0 - 100.0 fL    MCH 31.7 26.0 - 34.0 pg    MCHC 32.8 31.0 - 36.0 g/dL    RDW 13.1 12.4 - 15.4 %    Platelets 067 594 - 747 K/uL    MPV 7.5 5.0 - 10.5 fL    Neutrophils % 80.1 %    Lymphocytes % 6.1 %    Monocytes % 12.9 %    Eosinophils % 0.6 %    Basophils % 0.3 %    Neutrophils Absolute 3.1 1.7 - 7.7 K/uL    Lymphocytes Absolute 0.2 (L) 1.0 - 5.1 K/uL    Monocytes Absolute 0.5 0.0 - 1.3 K/uL    Eosinophils Absolute 0.0 0.0 - 0.6 K/uL    Basophils Absolute 0.0 0.0 - 0.2 K/uL   Comprehensive Metabolic Panel w/ Reflex to MG   Result Value Ref Range    Sodium 138 136 - 145 mmol/L    Potassium reflex Magnesium 4.4 3.5 - 5.1 mmol/L    Chloride 107 99 - 110 mmol/L    CO2 23 21 - 32 mmol/L    Anion Gap 8 3 - 16    Glucose 124 (H) 70 - 99 mg/dL    BUN 26 (H) 7 - 20 mg/dL    CREATININE 1.6 (H) 0.9 - 1.3 mg/dL    GFR Non- 46 (A) >60    GFR  56 (A) >60    Calcium 9.8 8.3 - 10.6 mg/dL    Total Protein 7.8 6.4 - 8.2 g/dL    Albumin 4.4 3.4 - 5.0 g/dL    Albumin/Globulin Ratio 1.3 1.1 - 2.2    Total Bilirubin 0.5 0.0 - 1.0 mg/dL    Alkaline Phosphatase 101 40 - 129 U/L    ALT 16 10 - 40 U/L    AST 23 15 - 37 U/L    Globulin 3.4 g/dL   Lipase   Result Value Ref Range    Lipase 33.0 13.0 - 60.0 U/L   Troponin   Result Value Ref Range    Troponin <0.01 <0.01 ng/mL   Urinalysis, reflex to microscopic   Result Value Ref Range    Color, UA Yellow Straw/Yellow    Clarity, UA SL CLOUDY (A) Clear    Glucose, Ur Negative Negative mg/dL    Bilirubin Urine Negative Negative    Ketones, Urine Negative Negative mg/dL Specific Gravity, UA 1.025 1.005 - 1.030    Blood, Urine Negative Negative    pH, UA 6.0 5.0 - 8.0    Protein, UA Negative Negative mg/dL    Urobilinogen, Urine 0.2 <2.0 E.U./dL    Nitrite, Urine Negative Negative    Leukocyte Esterase, Urine Negative Negative    Microscopic Examination Not Indicated     Urine Type Voided          RECENT VITALS:  BP: (!) 173/94, Temp: 97.6 °F (36.4 °C), Pulse: 62, Resp: 16, SpO2: 100 %     Procedures     None    ED Course     Nursing Notes, Past Medical Hx,Past Surgical Hx, Social Hx, Allergies, and Family Hx were reviewed. The patient was given the following medications:  Orders Placed This Encounter   Medications    ondansetron (ZOFRAN) injection 4 mg    fluticasone (FLONASE) 50 MCG/ACT nasal spray     Si sprays by Each Nostril route daily     Dispense:  1 Bottle     Refill:  0    ondansetron (ZOFRAN ODT) 4 MG disintegrating tablet     Sig: Take 1 tablet by mouth every 8 hours as needed for Nausea or Vomiting     Dispense:  10 tablet     Refill:  0       CONSULTS:  None    MEDICAL DECISION MAKING / ASSESSMENT / Gore Blas GLASS is a 50 y.o. male who presents to the emergency department with complaints of nausea. On presentation, patient is well-appearing and in no acute distress. Patient is afebrile with normal VS.    Patient's physical exam was unremarkable with the exception of effusion to the left TM. CBC without leukocytosis or anemia. BMP with a creatinine of 1.6 does not significantly increased compared to 1.4 from 5 months ago. No electrolyte abnormality. Troponin negative. Lipase within normal limits. Urinalysis without evidence of infection. Chest x-ray showed no acute cardiopulmonary abnormalities. EKG without evidence of acute ischemia or ST segment changes. Patient received Zofran with improvement of his nausea.  Etiology of his nausea is unknown at this time though could potentially be due to dietary habits versus side effect of one of his multiple medications. Given the patient's ear effusion he will be discharged with a prescription for Flonase and instructed to follow-up with ENT. At this point in time, patient is stable for discharge. Patient was given strict return precautions as outlined in the AVS. Patient was agreeable and understanding to this plan of care. Prior to discharge, patient was ambulatory and PO tolerant. The patient was seen and evaluated by the attending physician who agreed with the assessment and plan. The patient and / or the family were informed of the results of any tests, a time was given to answer questions, a plan was proposed and they agreed with plan. This note was dictated using voice-recognition software, which occasionally leads to inadvertent typographic errors. Clinical Impression     1. Nausea    2.  Acute effusion of left ear        Disposition     PATIENT REFERRED TO:  Juan Flower MD  [address] [place] [place] [address] [address]  [phone]    Schedule an appointment as soon as possible for a visit         DISCHARGE MEDICATIONS:  New Prescriptions    FLUTICASONE (FLONASE) 50 MCG/ACT NASAL SPRAY    2 sprays by Each Nostril route daily    ONDANSETRON (ZOFRAN ODT) 4 MG DISINTEGRATING TABLET    Take 1 tablet by mouth every 8 hours as needed for Nausea or Vomiting       DISPOSITION Decision To Discharge 08/05/2021 11:20:46 AM       Mesha Islas PA-C  08/05/21 1134

## 2021-08-05 NOTE — ED TRIAGE NOTES
Pt arrived to ED with nausea, no vomiting when woke up. Went to MD yesterday, told his BP was low at 100/70. Took his medications this mornning, did not check BP. Upon arrival /94.

## 2021-08-05 NOTE — ED NOTES
Pt discharged from ED in stable, ambulatory condition. Discharge instructions explained, all questions answered. Prescriptions given. Pt walked to [facility] independently.        Smooth Brasher RN  08/05/21 9970

## 2021-08-05 NOTE — DISCHARGE INSTR - COC
Continuity of Care Form    Patient Name: Fidelia    :  1975  MRN:  [id number]    Admit date:  2021  Discharge date:  ***    Code Status Order: Prior   Advance Directives:     Admitting Physician:  No admitting provider for patient encounter.   PCP: Federico Longoria MD    Discharging Nurse: [facility] Unit/Room#: D19/L18-46  Discharging Unit Phone Number: ***    Emergency Contact:   Extended Emergency Contact Information  Primary Emergency Contact: Deepali Abdi  Address: [address] [place] [address], [address] [place] [address] Phone: [phone]  Mobile Phone: [phone]  Relation: Spouse  Secondary Emergency Contact: Fiorella [address] Phone: [phone]  Mobile Phone: [phone]  Relation: Other    Past Surgical History:  Past Surgical History:   Procedure Laterality Date    DIALYSIS FISTULA CREATION Right     cephalic AV fistula    KIDNEY TRANSPLANT  2019       Immunization History:   Immunization History   Administered Date(s) Administered    Hepatitis A 2017    Hepatitis A Adult (Havrix, Vaqta) 2018    Hepatitis B 2017, 2017, 2017, 2017    Influenza Virus Vaccine 10/12/2018    Influenza, Elenita Thakkar, IM, PF (6 mo and older Fluzone, Flulaval, Fluarix, and 3 yrs and older Afluria) 10/24/2019, 2020    Pneumococcal Conjugate 13-valent (Sonda Nim) 2017, 2017    Pneumococcal Polysaccharide ([id number]) 2018    Tdap (Boostrix, Adacel) 2017, 2017       Active Problems:  Patient Active Problem List   Diagnosis Code    HTN (hypertension) I10    Proteinuria R80.9    Subcutaneous nodule of breast N63.0    Gynecomastia, male N58    SHERMAN (acute kidney injury) ([facility] N17.9    ESRD (end stage renal disease) on dialysis ([facility] N18.6, Z99.2    Paroxysmal atrial fibrillation (HCC) I48.0    Anemia of chronic renal failure, stage 5 ([address] N18.5, D63.1    Transplant recipient Z80.80    Obstructive sleep apnea syndrome G47.33       Isolation/Infection:   Isolation          No Isolation        Patient Infection Status     None to display          Nurse Assessment:  Last Vital Signs: BP (!) 173/94   Pulse 62   Temp 97.6 °F (36.4 °C) (Oral)   Resp 16   Ht 5' 8\" (1.727 m)   Wt 154 lb (69.9 kg)   SpO2 100%   BMI 23.42 kg/m²     Last documented pain score (0-10 scale):    Last Weight:   Wt Readings from Last 1 Encounters:   21 154 lb (69.9 kg)     Mental Status:  {IP PT MENTAL STATUS:}    IV Access:  { SHANEKA IV ACCESS:231622872}    Nursing Mobility/ADLs:  Walking   {CHP DME MHGN:020816656}  Transfer  {CHP DME MIOX:689510418}  Bathing  {CHP DME ZUGM:953372638}  Dressing  {CHP DME KTZE:425023478}  Toileting  {CHP DME FAR}  Feeding  {CHP DME BKVK:896083415}  Med Admin  {P DME NOGP:867855998}  Med Delivery   { SHANEKA MED Delivery:954687176}    Wound Care Documentation and Therapy:        Elimination:  Continence:   · Bowel: {YES / VH:44057}  · Bladder: {YES / NP:96592}  Urinary Catheter: {Urinary Catheter:352488039}   Colostomy/Ileostomy/Ileal Conduit: {YES / WZ:00217}       Date of Last BM: ***  No intake or output data in the 24 hours ending 21 1124  No intake/output data recorded.     Safety Concerns:     [address] Safety Concerns:805380222}    Impairments/Disabilities:      [address] Impairments/Disabilities:982846504}    Nutrition Therapy:  Current Nutrition Therapy:   [address] Diet List:633851860}    Routes of Feeding: {P DME Other Feedings:177171579}  Liquids: {Slp liquid thickness:92672}  Daily Fluid Restriction: {CHP DME Yes amt example:790738637}  Last Modified Barium Swallow with Video (Video Swallowing Test): {Done Not Done NKZA:754893242}    Treatments at the Time of Hospital Discharge:   Respiratory Treatments: ***  Oxygen Therapy:  {Therapy; copd oxygen:00224}  Ventilator:    {MH CC Vent EPLI:567789643}    Rehab Therapies: {THERAPEUTIC INTERVENTION:1422377705}  Weight Bearing Status/Restrictions: {[facility] Weight Bearin}  Other Medical Equipment (for information only, NOT a DME order):  {EQUIPMENT:157968725}  Other Treatments: ***    Patient's personal belongings (please select all that are sent with patient):  {[facility] DME Belongings:649853785}    RN SIGNATURE:  {Esignature:054764256}    CASE MANAGEMENT/SOCIAL WORK SECTION    Inpatient Status Date: ***    Readmission Risk Assessment Score:  Readmission Risk              Risk of Unplanned Readmission:  0           Discharging to Facility/ Agency   · Name:   · Address:  · Phone:  · Fax:    Dialysis Facility (if applicable)   · Name:  · Address:  · Dialysis Schedule:  · Phone:  · Fax:    / signature: {Esignature:486157279}    PHYSICIAN SECTION    Prognosis: {Prognosis:1191009994}    Condition at Discharge: [address] [place] [address] Patient Condition:235144797}    Rehab Potential (if transferring to Rehab): {Prognosis:3562338509}    Recommended Labs or Other Treatments After Discharge: ***    Physician Certification: I certify the above information and transfer of Vivian Martins II  is necessary for the continuing treatment of the diagnosis listed and that he requires {Admit to Appropriate Level of Care:83953} for {GREATER/LESS:894364981} 30 days.      Update Admission H&P: {CHP DME Changes in TYUDI:707634913}    PHYSICIAN SIGNATURE:  {Esignature:235934224}

## 2021-08-07 NOTE — ED PROVIDER NOTES
ED Attending Attestation Note     Date of evaluation: 8/5/2021    This patient was seen by the advance practice provider. I have seen and examined the patient, agree with the workup, evaluation, management and diagnosis. The care plan has been discussed. I have reviewed the ECG and concur with the ZACH's interpretation. My assessment reveals an overall well-appearing gentleman, presents to the emergency department with complaints of nausea, without other GI or infectious complaints. His work-up in the emergency department is reassuring. His nausea was resolved with a dose of Zofran. He also has complaints of muffled hearing and a sense of pressure in his left ear, feeling as though he is not able to \"pop that ear. On examination, he has scant retraction of the tympanic membrane with a serous effusion, with no evidence of purulence.          Leonid Jones MD  08/06/21 0650

## 2021-08-12 ENCOUNTER — OFFICE VISIT (OUTPATIENT)
Dept: ENT CLINIC | Age: 46
End: 2021-08-12
Payer: MEDICARE

## 2021-08-12 VITALS — DIASTOLIC BLOOD PRESSURE: 60 MMHG | SYSTOLIC BLOOD PRESSURE: 103 MMHG | HEART RATE: 65 BPM | TEMPERATURE: 97.3 F

## 2021-08-12 DIAGNOSIS — H91.92 HEARING LOSS OF LEFT EAR, UNSPECIFIED HEARING LOSS TYPE: Primary | ICD-10-CM

## 2021-08-12 DIAGNOSIS — H69.82 EUSTACHIAN TUBE DYSFUNCTION, LEFT: ICD-10-CM

## 2021-08-12 PROCEDURE — G8420 CALC BMI NORM PARAMETERS: HCPCS | Performed by: OTOLARYNGOLOGY

## 2021-08-12 PROCEDURE — 99203 OFFICE O/P NEW LOW 30 MIN: CPT | Performed by: OTOLARYNGOLOGY

## 2021-08-12 PROCEDURE — 1036F TOBACCO NON-USER: CPT | Performed by: OTOLARYNGOLOGY

## 2021-08-12 PROCEDURE — G8427 DOCREV CUR MEDS BY ELIG CLIN: HCPCS | Performed by: OTOLARYNGOLOGY

## 2021-08-12 RX ORDER — FLUTICASONE PROPIONATE 50 MCG
SPRAY, SUSPENSION (ML) NASAL
Qty: 1 BOTTLE | Refills: 1 | Status: SHIPPED | OUTPATIENT
Start: 2021-08-12 | End: 2022-04-15

## 2021-08-12 ASSESSMENT — ENCOUNTER SYMPTOMS
RHINORRHEA: 0
SORE THROAT: 0
SINUS PAIN: 0
TROUBLE SWALLOWING: 0
VOICE CHANGE: 0

## 2021-08-12 NOTE — PATIENT INSTRUCTIONS
EUSTACHIAN TUBE DYSFUNCTION MEASURES    Please do the following to attempt to improve your Eustachian tube dysfunction  and/or to help to resolve or prevent fluid in your middle ear:  1. Auto inflate your ears as instructed ( hold your nose closed, with your mouth closed and blow out as if blowing ear into or out of ears. If not successful, then swallow while doing the auto inflation maneuver and maintaining the pressure) every three hours, while awake, for ten days, and then four times daily and as needed for ear congestion. 2.  Take one Mucinex (blue box) maximum strength 1200 mg tablet every 12 hours daily for the next 14 days. (OTC)  (If your PCP does not approve Mucinex-D then use plain Mucinex twice daily). 3.  Use 0.05% Oxymetazoline (eg. Afrin, Duration, 4-Way) 12 hour decongestant nasal solution, with two sprays in each nostril three times a day for three days, then twice a day for two days, then stop for two days and then repeat the cycle once. 4.  Use fluticasone nasal spray (generic Flonase), 2 sprays in each nostril once daily, for 14 days and then as needed for ear congestion, Eustachian tube dysfunction.

## 2021-08-12 NOTE — PROGRESS NOTES
[facility]       NEW PATIENT VISIT      PCP:  Maia Smith MD      REFERRED BY:   [facility] ER      CHIEF COMPLAINT  Chief Complaint   Patient presents with    Ear Problem     fluid behind the left ear drum       HISTORY OF PRESENT ILLNESS       Uriel Vann II is a 50 y.o. male here for evaluation and treatment of fluid behind the left ear drum. Was seen at [facility] ER for something else. And doctor thought he saw middle ear effusion and referred to me. REVIEW OF SYSTEMS   Review of Systems   Constitutional: Negative for chills, fever and unexpected weight change. HENT: Positive for hearing loss (left ear). Negative for congestion, ear discharge, ear pain, rhinorrhea, sinus pain, sore throat, tinnitus, trouble swallowing and voice change. Neurological: Negative for dizziness. PAST MEDICAL HISTORY    Past Medical History:   Diagnosis Date    CKD (chronic kidney disease), stage III ([address] 4/20/2012    HTN (hypertension) 4/20/2012    Obstructive sleep apnea syndrome          Past Surgical History:   Procedure Laterality Date    DIALYSIS FISTULA CREATION Right 15/79/7020    cephalic AV fistula    KIDNEY TRANSPLANT  08/09/2019         EXAMINATION      Vitals:    08/12/21 1529   BP: 103/60   Site: Left Upper Arm   Position: Sitting   Cuff Size: Medium Adult   Pulse: 65   Temp: 97.3 °F (36.3 °C)   TempSrc: Temporal         Physical Exam  Vitals reviewed. Constitutional:       General: He is awake. He is not in acute distress. Appearance: Normal appearance. He is well-developed. He is not ill-appearing or toxic-appearing. HENT:      Head: Normocephalic and atraumatic. Salivary Glands: Right salivary gland is not diffusely enlarged or tender. Left salivary gland is not diffusely enlarged or tender.       Right Ear: Tympanic membrane, ear canal and external ear normal. No decreased hearing (finger rub heard at external meatus) noted. No middle ear effusion. There is impacted cerumen. No foreign body. Tympanic membrane has normal mobility. Left Ear: Tympanic membrane, ear canal and external ear normal. No decreased hearing (finger rub heard at external meatus) noted. No middle ear effusion. There is no impacted cerumen. Tympanic membrane has normal mobility. Ears:      Mohan exam findings: lateralizes right. Right Rinne: AC > BC. Left Rinne: AC > BC. Comments: Bilateral otomicroscopy performed. Nose: Nose normal. No nasal deformity, septal deviation, mucosal edema, congestion or rhinorrhea. Right Turbinates: Not enlarged. Left Turbinates: Not enlarged. Right Sinus: No maxillary sinus tenderness or frontal sinus tenderness. Left Sinus: No maxillary sinus tenderness or frontal sinus tenderness. Mouth/Throat:      Lips: Pink. No lesions. Mouth: Mucous membranes are moist. No oral lesions. Tongue: No lesions. Palate: No mass and lesions. Pharynx: Oropharynx is clear. Uvula midline. No oropharyngeal exudate or posterior oropharyngeal erythema. Tonsils: No tonsillar exudate or tonsillar abscesses. Neck:      Thyroid: No thyroid mass, thyromegaly or thyroid tenderness. Trachea: No tracheal deviation. Musculoskeletal:      Cervical back: Normal range of motion and neck supple. Lymphadenopathy:      Cervical: No cervical adenopathy. Neurological:      Mental Status: He is alert. Judith Ayala / Alva Fernando / Teena Oliver was seen today for ear problem. Diagnoses and all orders for this visit:    Hearing loss of left ear, unspecified hearing loss type  -     [place], [place]. [place], Audiology, [facility]    Eustachian tube dysfunction, left  -     fluticasone (FLONASE) 50 MCG/ACT nasal spray; 2 sprays in each nostril daily. RECOMMENDATIONS/PLAN      1.  Acetaminophen (eg. Tylenol) or Ibuprofen (eg. Advil) (over the counter medications) as needed for pain. 2. Mucinex-D, Sudafed, or other OTC medications for nasal and/or sinus congestion. 3. Fluticasone. 4. Return if symptoms worsen or fail to clear with treatment, or the hearing test is not normal, or, for any further ENT or sinus problems or symptoms. Patient Instructions   EUSTACHIAN TUBE DYSFUNCTION MEASURES    Please do the following to attempt to improve your Eustachian tube dysfunction  and/or to help to resolve or prevent fluid in your middle ear:  1. Auto inflate your ears as instructed ( hold your nose closed, with your mouth closed and blow out as if blowing ear into or out of ears. If not successful, then swallow while doing the auto inflation maneuver and maintaining the pressure) every three hours, while awake, for ten days, and then four times daily and as needed for ear congestion. 2.  Take one Mucinex (blue box) maximum strength 1200 mg tablet every 12 hours daily for the next 14 days. (OTC)  (If your PCP does not approve Mucinex-D then use plain Mucinex twice daily). 3.  Use 0.05% Oxymetazoline (eg. Afrin, Duration, 4-Way) 12 hour decongestant nasal solution, with two sprays in each nostril three times a day for three days, then twice a day for two days, then stop for two days and then repeat the cycle once. 4.  Use fluticasone nasal spray (generic Flonase), 2 sprays in each nostril once daily, for 14 days and then as needed for ear congestion, Eustachian tube dysfunction.

## 2021-09-03 ENCOUNTER — PROCEDURE VISIT (OUTPATIENT)
Dept: AUDIOLOGY | Age: 46
End: 2021-09-03
Payer: MEDICARE

## 2021-09-03 DIAGNOSIS — H91.90 MILD ACQUIRED HEARING LOSS: Primary | ICD-10-CM

## 2021-09-03 PROCEDURE — 92557 COMPREHENSIVE HEARING TEST: CPT | Performed by: AUDIOLOGIST

## 2021-09-03 PROCEDURE — 92567 TYMPANOMETRY: CPT | Performed by: AUDIOLOGIST

## 2022-01-11 ENCOUNTER — ANESTHESIA EVENT (OUTPATIENT)
Dept: ENDOSCOPY | Age: 47
End: 2022-01-11
Payer: MEDICARE

## 2022-01-12 ENCOUNTER — HOSPITAL ENCOUNTER (OUTPATIENT)
Age: 47
Setting detail: OUTPATIENT SURGERY
Discharge: HOME | End: 2022-01-12
Attending: INTERNAL MEDICINE | Admitting: INTERNAL MEDICINE
Payer: MEDICARE

## 2022-01-12 ENCOUNTER — ANESTHESIA (OUTPATIENT)
Dept: ENDOSCOPY | Age: 47
End: 2022-01-12
Payer: MEDICARE

## 2022-01-12 VITALS
BODY MASS INDEX: 22.88 KG/M2 | OXYGEN SATURATION: 98 % | RESPIRATION RATE: 11 BRPM | WEIGHT: 151 LBS | HEIGHT: 68 IN | DIASTOLIC BLOOD PRESSURE: 77 MMHG | SYSTOLIC BLOOD PRESSURE: 107 MMHG | HEART RATE: 66 BPM | TEMPERATURE: 97.4 F

## 2022-01-12 VITALS
OXYGEN SATURATION: 94 % | DIASTOLIC BLOOD PRESSURE: 71 MMHG | RESPIRATION RATE: 19 BRPM | SYSTOLIC BLOOD PRESSURE: 114 MMHG

## 2022-01-12 DIAGNOSIS — R12 HEARTBURN: ICD-10-CM

## 2022-01-12 PROCEDURE — 3609012400 HC EGD TRANSORAL BIOPSY SINGLE/MULTIPLE: Performed by: INTERNAL MEDICINE

## 2022-01-12 PROCEDURE — 7100000010 HC PHASE II RECOVERY - FIRST 15 MIN: Performed by: INTERNAL MEDICINE

## 2022-01-12 PROCEDURE — 3700000001 HC ADD 15 MINUTES (ANESTHESIA): Performed by: INTERNAL MEDICINE

## 2022-01-12 PROCEDURE — 2500000003 HC RX 250 WO HCPCS: Performed by: NURSE ANESTHETIST, CERTIFIED REGISTERED

## 2022-01-12 PROCEDURE — 2709999900 HC NON-CHARGEABLE SUPPLY: Performed by: INTERNAL MEDICINE

## 2022-01-12 PROCEDURE — 88305 TISSUE EXAM BY PATHOLOGIST: CPT

## 2022-01-12 PROCEDURE — 2580000003 HC RX 258: Performed by: FAMILY MEDICINE

## 2022-01-12 PROCEDURE — 3700000000 HC ANESTHESIA ATTENDED CARE: Performed by: INTERNAL MEDICINE

## 2022-01-12 PROCEDURE — 7100000011 HC PHASE II RECOVERY - ADDTL 15 MIN: Performed by: INTERNAL MEDICINE

## 2022-01-12 PROCEDURE — 2580000003 HC RX 258: Performed by: INTERNAL MEDICINE

## 2022-01-12 PROCEDURE — 6360000002 HC RX W HCPCS: Performed by: NURSE ANESTHETIST, CERTIFIED REGISTERED

## 2022-01-12 RX ORDER — SODIUM CHLORIDE 9 MG/ML
25 INJECTION, SOLUTION INTRAVENOUS PRN
Status: DISCONTINUED | OUTPATIENT
Start: 2022-01-12 | End: 2022-01-12 | Stop reason: HOSPADM

## 2022-01-12 RX ORDER — SODIUM CHLORIDE 9 MG/ML
INJECTION, SOLUTION INTRAVENOUS CONTINUOUS
Status: DISCONTINUED | OUTPATIENT
Start: 2022-01-12 | End: 2022-01-12 | Stop reason: HOSPADM

## 2022-01-12 RX ORDER — LIDOCAINE HYDROCHLORIDE 20 MG/ML
INJECTION, SOLUTION INFILTRATION; PERINEURAL PRN
Status: DISCONTINUED | OUTPATIENT
Start: 2022-01-12 | End: 2022-01-12 | Stop reason: SDUPTHER

## 2022-01-12 RX ORDER — LABETALOL HYDROCHLORIDE 5 MG/ML
5 INJECTION, SOLUTION INTRAVENOUS EVERY 10 MIN PRN
Status: DISCONTINUED | OUTPATIENT
Start: 2022-01-12 | End: 2022-01-12 | Stop reason: HOSPADM

## 2022-01-12 RX ORDER — SODIUM CHLORIDE 0.9 % (FLUSH) 0.9 %
5-40 SYRINGE (ML) INJECTION EVERY 12 HOURS SCHEDULED
Status: DISCONTINUED | OUTPATIENT
Start: 2022-01-12 | End: 2022-01-12 | Stop reason: HOSPADM

## 2022-01-12 RX ORDER — PANTOPRAZOLE SODIUM 40 MG/1
40 TABLET, DELAYED RELEASE ORAL
Qty: 30 TABLET | Refills: 1 | Status: SHIPPED | OUTPATIENT
Start: 2022-01-12 | End: 2022-04-15

## 2022-01-12 RX ORDER — SODIUM CHLORIDE 0.9 % (FLUSH) 0.9 %
5-40 SYRINGE (ML) INJECTION PRN
Status: DISCONTINUED | OUTPATIENT
Start: 2022-01-12 | End: 2022-01-12 | Stop reason: HOSPADM

## 2022-01-12 RX ORDER — GLYCOPYRROLATE 0.2 MG/ML
INJECTION INTRAMUSCULAR; INTRAVENOUS PRN
Status: DISCONTINUED | OUTPATIENT
Start: 2022-01-12 | End: 2022-01-12 | Stop reason: SDUPTHER

## 2022-01-12 RX ORDER — MEPERIDINE HYDROCHLORIDE 25 MG/ML
12.5 INJECTION INTRAMUSCULAR; INTRAVENOUS; SUBCUTANEOUS EVERY 5 MIN PRN
Status: DISCONTINUED | OUTPATIENT
Start: 2022-01-12 | End: 2022-01-12 | Stop reason: HOSPADM

## 2022-01-12 RX ORDER — PROPOFOL 10 MG/ML
INJECTION, EMULSION INTRAVENOUS PRN
Status: DISCONTINUED | OUTPATIENT
Start: 2022-01-12 | End: 2022-01-12 | Stop reason: SDUPTHER

## 2022-01-12 RX ORDER — HYDRALAZINE HYDROCHLORIDE 20 MG/ML
5 INJECTION INTRAMUSCULAR; INTRAVENOUS EVERY 10 MIN PRN
Status: DISCONTINUED | OUTPATIENT
Start: 2022-01-12 | End: 2022-01-12 | Stop reason: HOSPADM

## 2022-01-12 RX ORDER — SODIUM CHLORIDE, SODIUM LACTATE, POTASSIUM CHLORIDE, CALCIUM CHLORIDE 600; 310; 30; 20 MG/100ML; MG/100ML; MG/100ML; MG/100ML
INJECTION, SOLUTION INTRAVENOUS ONCE
Status: COMPLETED | OUTPATIENT
Start: 2022-01-12 | End: 2022-01-12

## 2022-01-12 RX ORDER — PROPOFOL 10 MG/ML
INJECTION, EMULSION INTRAVENOUS CONTINUOUS PRN
Status: DISCONTINUED | OUTPATIENT
Start: 2022-01-12 | End: 2022-01-12 | Stop reason: SDUPTHER

## 2022-01-12 RX ADMIN — SODIUM CHLORIDE, SODIUM LACTATE, POTASSIUM CHLORIDE, AND CALCIUM CHLORIDE: .6; .31; .03; .02 INJECTION, SOLUTION INTRAVENOUS at 09:16

## 2022-01-12 RX ADMIN — SODIUM CHLORIDE: 9 INJECTION, SOLUTION INTRAVENOUS at 11:10

## 2022-01-12 RX ADMIN — GLYCOPYRROLATE 0.1 MG: 0.2 INJECTION INTRAMUSCULAR; INTRAVENOUS at 11:21

## 2022-01-12 RX ADMIN — PROPOFOL 100 MCG/KG/MIN: 10 INJECTION, EMULSION INTRAVENOUS at 11:21

## 2022-01-12 RX ADMIN — PROPOFOL 50 MG: 10 INJECTION, EMULSION INTRAVENOUS at 11:24

## 2022-01-12 RX ADMIN — LIDOCAINE HYDROCHLORIDE 100 MG: 20 INJECTION, SOLUTION INFILTRATION; PERINEURAL at 11:21

## 2022-01-12 RX ADMIN — PROPOFOL 50 MG: 10 INJECTION, EMULSION INTRAVENOUS at 11:21

## 2022-01-12 ASSESSMENT — PULMONARY FUNCTION TESTS
PIF_VALUE: 1
PIF_VALUE: 0
PIF_VALUE: 1
PIF_VALUE: 0
PIF_VALUE: 1
PIF_VALUE: 0
PIF_VALUE: 1
PIF_VALUE: 1
PIF_VALUE: 0
PIF_VALUE: 0
PIF_VALUE: 1
PIF_VALUE: 1
PIF_VALUE: 0
PIF_VALUE: 1
PIF_VALUE: 0
PIF_VALUE: 1
PIF_VALUE: 0
PIF_VALUE: 1
PIF_VALUE: 0
PIF_VALUE: 1
PIF_VALUE: 0
PIF_VALUE: 1

## 2022-01-12 ASSESSMENT — PAIN SCALES - GENERAL: PAINLEVEL_OUTOF10: 0

## 2022-01-12 ASSESSMENT — PAIN - FUNCTIONAL ASSESSMENT: PAIN_FUNCTIONAL_ASSESSMENT: 0-10

## 2022-01-12 NOTE — ANESTHESIA PRE PROCEDURE
Department of Anesthesiology  Preprocedure Note       Name:  Anton Benavides   Age:  48 y.o.  :  1975                                          MRN:  [id number]         Date:  2022      Surgeon: Nya Montoya):  Debra Martinez MD    Procedure: Procedure(s):  ESOPHAGOGASTRODUODENOSCOPY    Medications prior to admission:   Prior to Admission medications    Medication Sig Start Date End Date Taking? Authorizing Provider   fluticasone (FLONASE) 50 MCG/ACT nasal spray 2 sprays in each nostril daily.  21   Parviz Collazo MD   ondansetron (ZOFRAN ODT) 4 MG disintegrating tablet Take 1 tablet by mouth every 8 hours as needed for Nausea or Vomiting  Patient not taking: Reported on 2021   Susie Morataya PA-C   tacrolimus (PROGRAF) 1 MG capsule 3 caps AM, 3 caps PM 20   Historical Provider, MD   dilTIAZemarian [facility]) 240 MG extended release capsule Take 240 mg by mouth daily 11/3/20   Historical Provider, MD   NIFEdipine (PROCARDIA XL) 90 MG extended release tablet Take 90 mg by mouth daily    Historical Provider, MD   Cholecalciferol (VITAMIN D3) 25 MCG (1000 UT) CAPS Take 1,000 mg by mouth daily    Historical Provider, MD   predniSONE (DELTASONE) 5 MG tablet Take 5 mg by mouth daily 10/28/20   Historical Provider, MD   mycophenolate (CELLCEPT) 250 MG capsule Take 750 mg by mouth 2 times daily    Historical Provider, MD   carvedilol (COREG) 12.5 MG tablet Take 12.5 mg by mouth daily  3/12/20   Historical Provider, MD   magnesium oxide (MAG-OX) 400 MG tablet Take 400 mg by mouth daily    Historical Provider, MD       Current medications:    Current Facility-Administered Medications   Medication Dose Route Frequency Provider Last Rate Last Admin    0.9 % sodium chloride infusion   IntraVENous Continuous Sierra Cloud MD        sodium chloride flush 0.9 % injection 5-40 mL  5-40 mL IntraVENous 2 times per day Sierra Cloud MD        sodium chloride flush 0.9 % injection 5-40 mL  5-40 mL IntraVENous PRN Janna Dillon MD        0.9 % sodium chloride infusion  25 mL IntraVENous PRN Janna Dillon MD           Allergies: Allergies   Allergen Reactions    Pollen Extract        Problem List:    Patient Active Problem List   Diagnosis Code    HTN (hypertension) I10    Proteinuria R80.9    Subcutaneous nodule of breast N63.0    Gynecomastia, male N58    SHERMAN (acute kidney injury) ([facility] N17.9    ESRD (end stage renal disease) on dialysis ([facility] N18.6, Z99.2    Paroxysmal atrial fibrillation (HCC) I48.0    Anemia of chronic renal failure, stage 5 ([facility] [facility] N18.5, D63.1    Transplant recipient Z80.80    Obstructive sleep apnea syndrome G47.33       Past Medical History:        Diagnosis Date    CKD (chronic kidney disease), stage III ([facility] [address] 2012    HTN (hypertension) 2012    Obstructive sleep apnea syndrome        Past Surgical History:        Procedure Laterality Date    DIALYSIS FISTULA CREATION Right     cephalic AV fistula    KIDNEY TRANSPLANT  2019       Social History:    Social History     Tobacco Use    Smoking status: Former Smoker     Packs/day: 0.00     Quit date: 2013     Years since quittin.5    Smokeless tobacco: Former User    Tobacco comment: quit date is 2013   Substance Use Topics    Alcohol use: Not Currently     Comment: occ                                Counseling given: Not Answered  Comment: quit date is 2013      Vital Signs (Current): There were no vitals filed for this visit.                                            BP Readings from Last 3 Encounters:   21 103/60   21 (!) 173/94   21 112/84       NPO Status:                                                                                 BMI:   Wt Readings from Last 3 Encounters:   21 154 lb (69.9 kg)   21 157 lb 11.2 oz (71.5 kg)   21 158 lb (71.7 kg)     There is no height or weight on file to calculate BMI.    CBC: Lab Results   Component Value Date    WBC 3.9 08/05/2021    RBC 4.23 08/05/2021    HGB 13.4 08/05/2021    HCT 40.8 08/05/2021    MCV 96.5 08/05/2021    RDW 13.1 08/05/2021     08/05/2021       CMP:   Lab Results   Component Value Date     08/05/2021    K 4.4 08/05/2021     08/05/2021    CO2 23 08/05/2021    BUN 26 08/05/2021    CREATININE 1.6 08/05/2021    GFRAA 56 08/05/2021    GFRAA 41 06/01/2012    AGRATIO 1.3 08/05/2021    LABGLOM 46 08/05/2021    GLUCOSE 124 08/05/2021    PROT 7.8 08/05/2021    CALCIUM 9.8 08/05/2021    BILITOT 0.5 08/05/2021    ALKPHOS 101 08/05/2021    AST 23 08/05/2021    ALT 16 08/05/2021       POC Tests: No results for input(s): POCGLU, POCNA, POCK, POCCL, POCBUN, POCHEMO, POCHCT in the last 72 hours. Coags:   Lab Results   Component Value Date    PROTIME 12.0 06/20/2018    INR 1.05 06/20/2018    APTT 32.6 02/16/2017       HCG (If Applicable): No results found for: PREGTESTUR, PREGSERUM, HCG, HCGQUANT     ABGs: No results found for: PHART, PO2ART, PPM6XXX, LKP8HDV, BEART, Z0NNDGSM     Type & Screen (If Applicable):  No results found for: LABABO, LABRH    Drug/Infectious Status (If Applicable):  No results found for: HIV, HEPCAB    COVID-19 Screening (If Applicable): No results found for: COVID19        Anesthesia Evaluation  Patient summary reviewed and Nursing notes reviewed history of anesthetic complications:   Airway: Mallampati: II  TM distance: >3 FB   Neck ROM: full  Mouth opening: > = 3 FB Dental: normal exam         Pulmonary:normal exam    (+) sleep apnea:                             Cardiovascular:    (+) hypertension:,                   Neuro/Psych:   Negative Neuro/Psych ROS              GI/Hepatic/Renal:            ROS comment: S/P kidney transplant. Endo/Other:                     Abdominal:             Vascular: negative vascular ROS.          Other Findings:             Anesthesia Plan      MAC     ASA 3       Induction: intravenous. Anesthetic plan and risks discussed with patient. Plan discussed with CRNA.     Attending anesthesiologist reviewed and agrees with Preprocedure content              Iza Simmons MD   1/12/2022

## 2022-01-12 NOTE — PROGRESS NOTES
Ambulatory Surgery/Procedure Discharge Note    Vitals:    01/12/22 1156   BP:    Pulse: 66   Resp: 11   Temp:    SpO2: 98%     Patient arrived to Phase II recovery Alert and Oriented x4. Vitals stable. Patient denies pain. No nausea or vomiting. Wife at bedside. Discharge instructions reviewed with patient and wife. Both verbalize understanding. In: 900 [I.V.:900]  Out: -     Restroom use offered before discharge. Yes    Pain assessment:  none  Pain Level: 0        Patient discharged to home/self care. Patient discharged via wheel chair home with spouse.       1/12/2022 12:14 PM

## 2022-01-12 NOTE — H&P
History and Physical / Pre-Sedation Assessment    Patient:  Emanuel Wei II   :   1975     Intended Procedure:  egd    HPI: dyspepsia with recurrent heartburn    Past Medical History:   has a past medical history of CKD (chronic kidney disease), stage III ([address] HTN (hypertension), and Obstructive sleep apnea syndrome. Past Surgical History:   has a past surgical history that includes Dialysis fistula creation (Right, 2017) and Kidney transplant (2019). Medications:  Prior to Admission medications    Medication Sig Start Date End Date Taking? Authorizing Provider   fluticasone (FLONASE) 50 MCG/ACT nasal spray 2 sprays in each nostril daily. 21  Yes Radha Ernst MD   tacrolimus (PROGRAF) 1 MG capsule 3 caps AM, 3 caps PM 20  Yes Historical Provider, MD   dilTIAZem (TIAZAC) 240 MG extended release capsule Take 240 mg by mouth daily 11/3/20  Yes Historical Provider, MD   NIFEdipine (PROCARDIA XL) 90 MG extended release tablet Take 90 mg by mouth daily   Yes Historical Provider, MD   Cholecalciferol (VITAMIN D3) 25 MCG (1000 UT) CAPS Take 1,000 mg by mouth daily   Yes Historical Provider, MD   predniSONE (DELTASONE) 5 MG tablet Take 5 mg by mouth daily 10/28/20  Yes Historical Provider, MD   mycophenolate (CELLCEPT) 250 MG capsule Take 750 mg by mouth 2 times daily   Yes Historical Provider, MD   carvedilol (COREG) 12.5 MG tablet Take 12.5 mg by mouth daily  3/12/20  Yes Historical Provider, MD   magnesium oxide (MAG-OX) 400 MG tablet Take 400 mg by mouth daily   Yes Historical Provider, MD   ondansetron (ZOFRAN ODT) 4 MG disintegrating tablet Take 1 tablet by mouth every 8 hours as needed for Nausea or Vomiting  Patient not taking: Reported on 2021   Nichole Melgar PA-C       Family History:  family history includes High Blood Pressure in his mother and sister. Social History:   reports that he quit smoking about 8 years ago. He smoked 0.00 packs per day.  He has quit using smokeless tobacco. He reports previous alcohol use. He reports that he does not use drugs. Allergies:  Pollen extract    ROS:  twelve point system review was unremarkable except for above noted history. Nurses notes reviewed and agreed. Medications reviewed    Physical Exam:  Vital Signs: /85   Pulse 65   Temp 97.4 °F (36.3 °C) (Temporal)   Resp 16   Ht 5' 8\" (1.727 m)   Wt 151 lb (68.5 kg)   SpO2 100%   BMI 22.96 kg/m²    Skin: normal  HEENT: normal  Neck: supple. No adenopathy. No thyromegaly. No JVD. Pulmonary:Normal  Cardiac:Normal  Abdomen:Normal  MS: normal  Neuro: normal  Ext: no edema. Pulses normal    Pre-Procedure Assessment / Plan:  ASA 2 - Patient with mild systemic disease with no functional limitations  Mallampati Airway Assessment:  Mallampati Class II - (soft palate, fauces & uvula are visible)  Level of Sedation Plan: Moderate sedation  Post Procedure plan: Return to same level of care    I assessed the patient and find that the patient is in satisfactory condition to proceed with the planned procedure and sedation plan. I have explained the risk, benefits, and alternatives to the procedure. The patient understands and agrees to proceed.   Yes    Nemo Celis MD  9:53 AM 1/12/2022

## 2022-01-12 NOTE — ANESTHESIA POSTPROCEDURE EVALUATION
Department of Anesthesiology  Postprocedure Note    Patient: Jericho Meyer  MRN: [id number]  YOB: 1975  Date of evaluation: 1/12/2022  Time:  12:33 PM     Procedure Summary     Date: 01/12/22 Room / Location: [address] [place] [address] Andrew Curyr  / [facility]    Anesthesia Start: 1110 Anesthesia Stop: 4254    Procedure: EGD BIOPSY (N/A ) Diagnosis:       Heartburn      (Heartburn [R12])    Surgeons: Tanner Gomze MD Responsible Provider: Niranjan Villagomez MD    Anesthesia Type: MAC ASA Status: 3          Anesthesia Type: MAC    Anthony Phase I: Anthony Score: 9    Anthony Phase II:      Last vitals: Reviewed and per EMR flowsheets.        Anesthesia Post Evaluation    Patient location during evaluation: PACU  Patient participation: complete - patient participated  Level of consciousness: awake and alert  Airway patency: patent  Nausea & Vomiting: no nausea and no vomiting  Complications: no  Cardiovascular status: hemodynamically stable  Respiratory status: acceptable  Hydration status: euvolemic

## 2022-01-12 NOTE — OP NOTE
[address]                [address]                                OPERATIVE REPORT    PATIENT NAME: Gómez Castillo                    :        1975  MED REC NO:   [id number]                          ROOM:  ACCOUNT NO:   [de-identified]                           ADMIT DATE: 2022  PROVIDER:     Scott Pandey MD    DATE OF PROCEDURE:  2022    SURGEON:  Scott Pandey MD    INDICATION FOR PROCEDURE:  A 64-year-old man who presented with  dyspepsia, abdominal pain, and heartburn. DESCRIPTION OF THE PROCEDURE:  With the patient in the left lateral  position and after IV Diprivan, the Olympus video endoscope was  introduced into the esophagus and advanced toward the GE junction. There was very mild distal esophagitis noted with an area concerning for  Morales's at the GE junction. Biopsies were obtained. The stomach  showed moderate gastritis, and biopsies were obtained for Helicobacter  pylori. The duodenum revealed duodenal ulcer with duodenitis. No other  abnormality. The scope was then removed without complications. IMPRESSION:  1. Possible Morales's esophagus with distal esophagitis. 2.  Moderate-to-severe gastritis. 3.  Duodenal ulcer.     ESTIMATED BLOOD LOSS:  Will Izquierdo MD    D: 2022 11:52:30       T: 2022 12:31:27     [email or web address]  Job#: [id number]     Doc#: [id number]    CC:  MD Jozef Flowers MD

## 2022-02-16 ENCOUNTER — OFFICE VISIT (OUTPATIENT)
Dept: PRIMARY CARE CLINIC | Age: 47
End: 2022-02-16
Payer: MEDICARE

## 2022-02-16 VITALS
HEART RATE: 69 BPM | HEIGHT: 68 IN | TEMPERATURE: 97.3 F | DIASTOLIC BLOOD PRESSURE: 89 MMHG | SYSTOLIC BLOOD PRESSURE: 134 MMHG | WEIGHT: 154 LBS | BODY MASS INDEX: 23.34 KG/M2

## 2022-02-16 DIAGNOSIS — Z12.11 SCREENING FOR COLON CANCER: Primary | ICD-10-CM

## 2022-02-16 DIAGNOSIS — Z99.2 ESRD (END STAGE RENAL DISEASE) ON DIALYSIS (HCC): ICD-10-CM

## 2022-02-16 DIAGNOSIS — N18.6 ESRD (END STAGE RENAL DISEASE) ON DIALYSIS (HCC): ICD-10-CM

## 2022-02-16 DIAGNOSIS — I48.0 PAROXYSMAL ATRIAL FIBRILLATION (HCC): ICD-10-CM

## 2022-02-16 PROCEDURE — G8427 DOCREV CUR MEDS BY ELIG CLIN: HCPCS | Performed by: INTERNAL MEDICINE

## 2022-02-16 PROCEDURE — 1036F TOBACCO NON-USER: CPT | Performed by: INTERNAL MEDICINE

## 2022-02-16 PROCEDURE — 99215 OFFICE O/P EST HI 40 MIN: CPT | Performed by: INTERNAL MEDICINE

## 2022-02-16 PROCEDURE — G8420 CALC BMI NORM PARAMETERS: HCPCS | Performed by: INTERNAL MEDICINE

## 2022-02-16 PROCEDURE — G8484 FLU IMMUNIZE NO ADMIN: HCPCS | Performed by: INTERNAL MEDICINE

## 2022-02-16 SDOH — ECONOMIC STABILITY: FOOD INSECURITY: WITHIN THE PAST 12 MONTHS, THE FOOD YOU BOUGHT JUST DIDN'T LAST AND YOU DIDN'T HAVE MONEY TO GET MORE.: NEVER TRUE

## 2022-02-16 SDOH — ECONOMIC STABILITY: FOOD INSECURITY: WITHIN THE PAST 12 MONTHS, YOU WORRIED THAT YOUR FOOD WOULD RUN OUT BEFORE YOU GOT MONEY TO BUY MORE.: NEVER TRUE

## 2022-02-16 ASSESSMENT — PATIENT HEALTH QUESTIONNAIRE - PHQ9
SUM OF ALL RESPONSES TO PHQ QUESTIONS 1-9: 0
2. FEELING DOWN, DEPRESSED OR HOPELESS: 0
1. LITTLE INTEREST OR PLEASURE IN DOING THINGS: 0
SUM OF ALL RESPONSES TO PHQ QUESTIONS 1-9: 0
SUM OF ALL RESPONSES TO PHQ9 QUESTIONS 1 & 2: 0
SUM OF ALL RESPONSES TO PHQ QUESTIONS 1-9: 0
SUM OF ALL RESPONSES TO PHQ QUESTIONS 1-9: 0

## 2022-02-16 ASSESSMENT — ENCOUNTER SYMPTOMS
EYES NEGATIVE: 1
EYE DISCHARGE: 0
RESPIRATORY NEGATIVE: 1

## 2022-02-16 ASSESSMENT — SOCIAL DETERMINANTS OF HEALTH (SDOH): HOW HARD IS IT FOR YOU TO PAY FOR THE VERY BASICS LIKE FOOD, HOUSING, MEDICAL CARE, AND HEATING?: NOT HARD AT ALL

## 2022-02-16 NOTE — PATIENT INSTRUCTIONS
Please get the cardiac echo that was suggested by the cardiologist at [facility] in August. Your blood pressure is normal today. . Follow-up with me in 6 months.

## 2022-02-16 NOTE — PROGRESS NOTES
Lul Bryan II (:  1975) is a 48 y.o. male,Established patient, here for evaluation of the following chief complaint(s):  Check-Up (states that it has been a while since he was last seen )    I have not seen the patient since 2019. He has end-stage renal disease from suspected hypertensive kidney disease. He is doing well with his current kidney transplant and his follow-up is with nephrology and cardiology on a regular basis. I reviewed the patient's history today which required quite a bit of time and urged him to follow-up with gastroenterology for a colonoscopy in the near future. ASSESSMENT/PLAN:  1. Screening for colon cancer  2. ESRD (end stage renal disease) on dialysis [facility])  Assessment & Plan:   Monitored by specialist- no acute findings meriting change in the plan    3. Paroxysmal atrial fibrillation [facility])  Assessment & Plan:   Monitored by specialist- no acute findings meriting change in the plan        Return in about 6 months (around 2022). Subjective   SUBJECTIVE/OBJECTIVE:  Hypertension  This is a chronic problem. The current episode started more than 1 year ago. The problem is unchanged. The problem is controlled. Review of Systems   Constitutional: Negative for activity change and appetite change. HENT: Negative. Eyes: Negative. Negative for discharge. Respiratory: Negative. Genitourinary: Negative for difficulty urinating. Musculoskeletal: Negative for arthralgias. Skin: Negative for rash. Neurological: Negative. Psychiatric/Behavioral: Negative. Negative for agitation and confusion. The patient is not nervous/anxious. All other systems reviewed and are negative. Objective   Physical Exam  Constitutional:       Appearance: He is well-developed. HENT:      Head: Normocephalic and atraumatic. Eyes:      Conjunctiva/sclera: Conjunctivae normal.      Pupils: Pupils are equal, round, and reactive to light.    Cardiovascular: Rate and Rhythm: Normal rate and regular rhythm. Heart sounds: Normal heart sounds. Pulmonary:      Effort: Pulmonary effort is normal.      Breath sounds: Normal breath sounds. Musculoskeletal:         General: Normal range of motion. Cervical back: Normal range of motion and neck supple. Skin:     General: Skin is warm and dry. Neurological:      Mental Status: He is alert and oriented to person, place, and time. Psychiatric:         Behavior: Behavior normal.         Thought Content: Thought content normal.            Paroxysmal atrial fibrillation ([address]   Monitored by specialist- no acute findings meriting change in the plan      ESRD (end stage renal disease) on dialysis [facility])   Monitored by specialist- no acute findings meriting change in the plan         On this date 2/16/2022 I have spent 40 minutes reviewing previous notes, test results and face to face with the patient discussing the diagnosis and importance of compliance with the treatment plan as well as documenting on the day of the visit. An electronic signature was used to authenticate this note.     --Nova Cee MD

## 2022-03-22 ENCOUNTER — ANESTHESIA EVENT (OUTPATIENT)
Dept: ENDOSCOPY | Age: 47
End: 2022-03-22
Payer: MEDICARE

## 2022-03-23 ENCOUNTER — ANESTHESIA (OUTPATIENT)
Dept: ENDOSCOPY | Age: 47
End: 2022-03-23
Payer: MEDICARE

## 2022-03-23 ENCOUNTER — HOSPITAL ENCOUNTER (OUTPATIENT)
Age: 47
Setting detail: OUTPATIENT SURGERY
Discharge: HOME | End: 2022-03-23
Attending: INTERNAL MEDICINE | Admitting: INTERNAL MEDICINE
Payer: MEDICARE

## 2022-03-23 VITALS — SYSTOLIC BLOOD PRESSURE: 115 MMHG | DIASTOLIC BLOOD PRESSURE: 75 MMHG | OXYGEN SATURATION: 99 %

## 2022-03-23 VITALS
SYSTOLIC BLOOD PRESSURE: 110 MMHG | DIASTOLIC BLOOD PRESSURE: 80 MMHG | HEIGHT: 68 IN | BODY MASS INDEX: 23.04 KG/M2 | TEMPERATURE: 97.8 F | RESPIRATION RATE: 16 BRPM | HEART RATE: 66 BPM | WEIGHT: 152 LBS | OXYGEN SATURATION: 100 %

## 2022-03-23 DIAGNOSIS — K29.70 GASTRITIS, PRESENCE OF BLEEDING UNSPECIFIED, UNSPECIFIED CHRONICITY, UNSPECIFIED GASTRITIS TYPE: ICD-10-CM

## 2022-03-23 DIAGNOSIS — K26.9 DUODENAL ULCER: ICD-10-CM

## 2022-03-23 PROCEDURE — 7100000011 HC PHASE II RECOVERY - ADDTL 15 MIN: Performed by: INTERNAL MEDICINE

## 2022-03-23 PROCEDURE — 2580000003 HC RX 258: Performed by: ANESTHESIOLOGY

## 2022-03-23 PROCEDURE — 3609012400 HC EGD TRANSORAL BIOPSY SINGLE/MULTIPLE: Performed by: INTERNAL MEDICINE

## 2022-03-23 PROCEDURE — 7100000010 HC PHASE II RECOVERY - FIRST 15 MIN: Performed by: INTERNAL MEDICINE

## 2022-03-23 PROCEDURE — 88305 TISSUE EXAM BY PATHOLOGIST: CPT

## 2022-03-23 PROCEDURE — 6360000002 HC RX W HCPCS: Performed by: NURSE ANESTHETIST, CERTIFIED REGISTERED

## 2022-03-23 PROCEDURE — 2500000003 HC RX 250 WO HCPCS: Performed by: NURSE ANESTHETIST, CERTIFIED REGISTERED

## 2022-03-23 PROCEDURE — 2709999900 HC NON-CHARGEABLE SUPPLY: Performed by: INTERNAL MEDICINE

## 2022-03-23 PROCEDURE — 3700000001 HC ADD 15 MINUTES (ANESTHESIA): Performed by: INTERNAL MEDICINE

## 2022-03-23 PROCEDURE — 3700000000 HC ANESTHESIA ATTENDED CARE: Performed by: INTERNAL MEDICINE

## 2022-03-23 RX ORDER — SODIUM CHLORIDE, SODIUM LACTATE, POTASSIUM CHLORIDE, CALCIUM CHLORIDE 600; 310; 30; 20 MG/100ML; MG/100ML; MG/100ML; MG/100ML
INJECTION, SOLUTION INTRAVENOUS CONTINUOUS
Status: DISCONTINUED | OUTPATIENT
Start: 2022-03-23 | End: 2022-03-23 | Stop reason: HOSPADM

## 2022-03-23 RX ORDER — SODIUM CHLORIDE 0.9 % (FLUSH) 0.9 %
5-40 SYRINGE (ML) INJECTION PRN
Status: DISCONTINUED | OUTPATIENT
Start: 2022-03-23 | End: 2022-03-23 | Stop reason: HOSPADM

## 2022-03-23 RX ORDER — NIFEDIPINE 60 MG/1
TABLET, EXTENDED RELEASE ORAL
COMMUNITY
Start: 2022-03-15

## 2022-03-23 RX ORDER — SODIUM CHLORIDE 9 MG/ML
25 INJECTION, SOLUTION INTRAVENOUS PRN
Status: DISCONTINUED | OUTPATIENT
Start: 2022-03-23 | End: 2022-03-23 | Stop reason: HOSPADM

## 2022-03-23 RX ORDER — LIDOCAINE HYDROCHLORIDE 20 MG/ML
INJECTION, SOLUTION INTRAVENOUS PRN
Status: DISCONTINUED | OUTPATIENT
Start: 2022-03-23 | End: 2022-03-23 | Stop reason: SDUPTHER

## 2022-03-23 RX ORDER — PROPOFOL 10 MG/ML
INJECTION, EMULSION INTRAVENOUS CONTINUOUS PRN
Status: DISCONTINUED | OUTPATIENT
Start: 2022-03-23 | End: 2022-03-23 | Stop reason: SDUPTHER

## 2022-03-23 RX ORDER — DILTIAZEM HYDROCHLORIDE 240 MG/1
CAPSULE, COATED, EXTENDED RELEASE ORAL
COMMUNITY
Start: 2022-02-22

## 2022-03-23 RX ORDER — GLYCOPYRROLATE 0.2 MG/ML
INJECTION INTRAMUSCULAR; INTRAVENOUS PRN
Status: DISCONTINUED | OUTPATIENT
Start: 2022-03-23 | End: 2022-03-23 | Stop reason: SDUPTHER

## 2022-03-23 RX ORDER — PROPOFOL 10 MG/ML
INJECTION, EMULSION INTRAVENOUS PRN
Status: DISCONTINUED | OUTPATIENT
Start: 2022-03-23 | End: 2022-03-23 | Stop reason: SDUPTHER

## 2022-03-23 RX ORDER — SODIUM CHLORIDE 0.9 % (FLUSH) 0.9 %
5-40 SYRINGE (ML) INJECTION EVERY 12 HOURS SCHEDULED
Status: DISCONTINUED | OUTPATIENT
Start: 2022-03-23 | End: 2022-03-23 | Stop reason: HOSPADM

## 2022-03-23 RX ORDER — PANTOPRAZOLE SODIUM 40 MG/1
40 TABLET, DELAYED RELEASE ORAL
Qty: 60 TABLET | Refills: 2 | Status: SHIPPED | OUTPATIENT
Start: 2022-03-23

## 2022-03-23 RX ADMIN — GLYCOPYRROLATE 0.2 MG: 0.2 INJECTION, SOLUTION INTRAMUSCULAR; INTRAVENOUS at 08:02

## 2022-03-23 RX ADMIN — PROPOFOL 150 MCG/KG/MIN: 10 INJECTION, EMULSION INTRAVENOUS at 08:05

## 2022-03-23 RX ADMIN — PROPOFOL 20 MG: 10 INJECTION, EMULSION INTRAVENOUS at 08:09

## 2022-03-23 RX ADMIN — PROPOFOL 80 MG: 10 INJECTION, EMULSION INTRAVENOUS at 08:05

## 2022-03-23 RX ADMIN — SODIUM CHLORIDE, POTASSIUM CHLORIDE, SODIUM LACTATE AND CALCIUM CHLORIDE: 600; 310; 30; 20 INJECTION, SOLUTION INTRAVENOUS at 07:34

## 2022-03-23 RX ADMIN — LIDOCAINE HYDROCHLORIDE 100 MG: 20 INJECTION, SOLUTION INTRAVENOUS at 08:05

## 2022-03-23 RX ADMIN — PROPOFOL 20 MG: 10 INJECTION, EMULSION INTRAVENOUS at 08:06

## 2022-03-23 ASSESSMENT — PULMONARY FUNCTION TESTS
PIF_VALUE: 1
PIF_VALUE: 0
PIF_VALUE: 1
PIF_VALUE: 0
PIF_VALUE: 1
PIF_VALUE: 0
PIF_VALUE: 1
PIF_VALUE: 1
PIF_VALUE: 0
PIF_VALUE: 1
PIF_VALUE: 0
PIF_VALUE: 1

## 2022-03-23 ASSESSMENT — PAIN - FUNCTIONAL ASSESSMENT
PAIN_FUNCTIONAL_ASSESSMENT: 0-10
PAIN_FUNCTIONAL_ASSESSMENT: 0-10

## 2022-03-23 NOTE — OP NOTE
[address] [facility]               [address]                                OPERATIVE REPORT    PATIENT NAME: Claude Chars                    :        1975  MED REC NO:   [id number]                          ROOM:  ACCOUNT NO:   [de-identified]                           ADMIT DATE: 2022  PROVIDER:     Eda Dunn MD    DATE OF PROCEDURE:  2022    SURGEON:  Eda Dunn MD    INDICATION FOR THE PROCEDURE:  Followup duodenal ulcer to ensure  healing. DESCRIPTION OF PROCEDURE:  With the patient in the left lateral position  and after IV Diprivan, the Olympus video endoscope was introduced into  the esophagus and advanced towards the gastroesophageal junction. The  esophagus was normal.  The previously noted esophagitis have resolved. Stomach was carefully inspected, it was normal; however, there was some  inflammation noted in the pyloric channel and biopsies were obtained. Another biopsy was obtained from the antrum for Helicobacter pylori. The duodenum revealed persistent duodenal ulcer with duodenitis. Scope  was then removed without complication. IMPRESSION:  Persistent duodenal ulcer with duodenitis. ESTIMATED BLOOD LOSS:  None. Rocky Noel MD    D: 2022 8:34:34       T: 2022 12:05:26     [email or web address]  Job#: [id number]     Doc#: [id number]    CC:   Eda Dunn MD

## 2022-03-23 NOTE — H&P
History and Physical / Pre-Sedation Assessment    Patient:  Siri Kunz II   :   1975     Intended Procedure:  egd    HPI: fu duodenal ulcer     Past Medical History:   has a past medical history of CKD (chronic kidney disease), stage III ([address] HTN (hypertension), and Obstructive sleep apnea syndrome. Past Surgical History:   has a past surgical history that includes Dialysis fistula creation (Right, 2017); Kidney transplant (2019); and Upper gastrointestinal endoscopy (N/A, 2022). Medications:  Prior to Admission medications    Medication Sig Start Date End Date Taking? Authorizing Provider   dilTIAZem (CARDIZEM CD) 240 MG extended release capsule  22   Historical Provider, MD   NIFEdipine (PROCARDIA XL) 60 MG extended release tablet  3/15/22   Historical Provider, MD   pantoprazole (PROTONIX) 40 MG tablet Take 1 tablet by mouth every morning (before breakfast)  Patient not taking: Reported on 3/23/2022 1/12/22   Ruthann Escamilla MD   fluticasone [facility]) 50 MCG/ACT nasal spray 2 sprays in each nostril daily.   Patient not taking: Reported on 3/23/2022 8/12/21   Gisele Hunt MD   ondansetron (ZOFRAN ODT) 4 MG disintegrating tablet Take 1 tablet by mouth every 8 hours as needed for Nausea or Vomiting  Patient not taking: Reported on 2021   Marco A Lamb PA-C   tacrolimus (PROGRAF) 1 MG capsule 3 caps AM, 3 caps PM 20   Historical Provider, MD   Cholecalciferol (VITAMIN D3) 25 MCG (1000 UT) CAPS Take 1,000 mg by mouth daily    Historical Provider, MD   predniSONE (DELTASONE) 5 MG tablet Take 5 mg by mouth daily 10/28/20   Historical Provider, MD   mycophenolate (CELLCEPT) 250 MG capsule Take 750 mg by mouth 2 times daily    Historical Provider, MD   carvedilol (COREG) 12.5 MG tablet Take 12.5 mg by mouth daily  3/12/20   Historical Provider, MD   magnesium oxide (MAG-OX) 400 MG tablet Take 400 mg by mouth daily    Historical Provider, MD       Family History:  family history includes High Blood Pressure in his mother and sister. Social History:   reports that he quit smoking about 8 years ago. He smoked 0.00 packs per day. He has never used smokeless tobacco. He reports previous alcohol use. He reports that he does not use drugs. Allergies:  Pollen extract    ROS:  twelve point system review was unremarkable except for above noted history. Nurses notes reviewed and agreed. Medications reviewed    Physical Exam:  Vital Signs: /84   Pulse 63   Resp 16   Ht 5' 8\" (1.727 m)   Wt 152 lb (68.9 kg)   SpO2 100%   BMI 23.11 kg/m²    Skin: normal  HEENT: normal  Neck: supple. No adenopathy. No thyromegaly. No JVD. Pulmonary:Normal  Cardiac:Normal  Abdomen:Normal  MS: normal  Neuro: normal  Ext: no edema. Pulses normal    Pre-Procedure Assessment / Plan:  ASA 2 - Patient with mild systemic disease with no functional limitations  Mallampati Airway Assessment:  Mallampati Class II - (soft palate, fauces & uvula are visible)  Level of Sedation Plan: Moderate sedation  Post Procedure plan: Return to same level of care    I assessed the patient and find that the patient is in satisfactory condition to proceed with the planned procedure and sedation plan. I have explained the risk, benefits, and alternatives to the procedure. The patient understands and agrees to proceed.   Yes    Anamika Blancas MD  8:02 AM 3/23/2022

## 2022-03-23 NOTE — ANESTHESIA PRE PROCEDURE
Department of Anesthesiology  Preprocedure Note       Name:  Chaparrita Hwang   Age:  48 y.o.  :  1975                                          MRN:  [id number]         Date:  3/23/2022      Surgeon: Katrin Larose):  Mane Camejo MD    Procedure: Procedure(s):  ESOPHAGOGASTRODUODENOSCOPY    Medications prior to admission:   Prior to Admission medications    Medication Sig Start Date End Date Taking? Authorizing Provider   dilTIAZem (CARDIZEM CD) 240 MG extended release capsule  22   Historical Provider, MD   NIFEdipine (PROCARDIA XL) 60 MG extended release tablet  3/15/22   Historical Provider, MD   pantoprazole (PROTONIX) 40 MG tablet Take 1 tablet by mouth every morning (before breakfast)  Patient not taking: Reported on 3/23/2022 1/12/22   Mane Camejo MD   fluticasone [facility]) 50 MCG/ACT nasal spray 2 sprays in each nostril daily. Patient not taking: Reported on 3/23/2022 8/12/21   Jm Arias MD   ondansetron (ZOFRAN ODT) 4 MG disintegrating tablet Take 1 tablet by mouth every 8 hours as needed for Nausea or Vomiting  Patient not taking: Reported on 2021   Chloe Holder PA-C   tacrolimus (PROGRAF) 1 MG capsule 3 caps AM, 3 caps PM 20   Historical Provider, MD   Cholecalciferol (VITAMIN D3) 25 MCG (1000 UT) CAPS Take 1,000 mg by mouth daily    Historical Provider, MD   predniSONE (DELTASONE) 5 MG tablet Take 5 mg by mouth daily 10/28/20   Historical Provider, MD   mycophenolate (CELLCEPT) 250 MG capsule Take 750 mg by mouth 2 times daily    Historical Provider, MD   carvedilol (COREG) 12.5 MG tablet Take 12.5 mg by mouth daily  3/12/20   Historical Provider, MD   magnesium oxide (MAG-OX) 400 MG tablet Take 400 mg by mouth daily    Historical Provider, MD       Current medications:    No current outpatient medications on file. No current facility-administered medications for this visit. Allergies:     Allergies   Allergen Reactions    Pollen Extract        Problem List:    Patient Active Problem List   Diagnosis Code    HTN (hypertension) I10    Proteinuria R80.9    Subcutaneous nodule of breast N63.0    Gynecomastia, male N58    SHERMAN (acute kidney injury) ([facility] N17.9    ESRD (end stage renal disease) on dialysis ([facility] N18.6, Z99.2    Paroxysmal atrial fibrillation (HCC) I48.0    Anemia of chronic renal failure, stage 5 ([facility] N18.5, D63.1    Transplant recipient Z80.80    Obstructive sleep apnea syndrome G47.33       Past Medical History:        Diagnosis Date    CKD (chronic kidney disease), stage III ([facility] 2012    HTN (hypertension) 2012    Obstructive sleep apnea syndrome        Past Surgical History:        Procedure Laterality Date    DIALYSIS FISTULA CREATION Right     cephalic AV fistula    KIDNEY TRANSPLANT  2019    UPPER GASTROINTESTINAL ENDOSCOPY N/A 2022    EGD BIOPSY performed by Nemo Celis MD at [address] History:    Social History     Tobacco Use    Smoking status: Former Smoker     Packs/day: 0.00     Quit date: 2013     Years since quittin.7    Smokeless tobacco: Never Used    Tobacco comment: quit date is 2013   Substance Use Topics    Alcohol use: Not Currently     Comment: occ                                Counseling given: Not Answered  Comment: quit date is 2013      Vital Signs (Current): There were no vitals filed for this visit.                                            BP Readings from Last 3 Encounters:   22 134/89   22 114/71   22 107/77       NPO Status:                                                                                 BMI:   Wt Readings from Last 3 Encounters:   22 152 lb (68.9 kg)   22 154 lb (69.9 kg)   22 151 lb (68.5 kg)     There is no height or weight on file to calculate BMI.    CBC:   Lab Results   Component Value Date    WBC 3.9 2021    RBC 4.23 2021    HGB 13.4 2021 HCT 40.8 08/05/2021    MCV 96.5 08/05/2021    RDW 13.1 08/05/2021     08/05/2021       CMP:   Lab Results   Component Value Date     08/05/2021    K 4.4 08/05/2021     08/05/2021    CO2 23 08/05/2021    BUN 26 08/05/2021    CREATININE 1.6 08/05/2021    GFRAA 56 08/05/2021    GFRAA 41 06/01/2012    AGRATIO 1.3 08/05/2021    LABGLOM 46 08/05/2021    GLUCOSE 124 08/05/2021    PROT 7.8 08/05/2021    CALCIUM 9.8 08/05/2021    BILITOT 0.5 08/05/2021    ALKPHOS 101 08/05/2021    AST 23 08/05/2021    ALT 16 08/05/2021       POC Tests: No results for input(s): POCGLU, POCNA, POCK, POCCL, POCBUN, POCHEMO, POCHCT in the last 72 hours. Coags:   Lab Results   Component Value Date    PROTIME 12.0 06/20/2018    INR 1.05 06/20/2018    APTT 32.6 02/16/2017       HCG (If Applicable): No results found for: PREGTESTUR, PREGSERUM, HCG, HCGQUANT     ABGs: No results found for: PHART, PO2ART, DPZ8GEY, AST0OCK, BEART, O3VKJHFT     Type & Screen (If Applicable):  No results found for: LABABO, LABRH    Drug/Infectious Status (If Applicable):  No results found for: HIV, HEPCAB    COVID-19 Screening (If Applicable): No results found for: COVID19        Anesthesia Evaluation  Patient summary reviewed and Nursing notes reviewed history of anesthetic complications:   Airway: Mallampati: II  TM distance: >3 FB   Neck ROM: full  Mouth opening: > = 3 FB Dental: normal exam         Pulmonary:normal exam    (+) sleep apnea:                             Cardiovascular:  Exercise tolerance: good (>4 METS),   (+) hypertension:,     (-)  angina, orthopnea and PND    ECG reviewed  Rhythm: regular  Rate: normal  Echocardiogram reviewed         Beta Blocker:  Dose within 24 Hrs      ROS comment: Normal left ventricle size and systolic function with an estimated ejection   fraction of 65%. There is mild concentric left ventricular hypertrophy. No regional wall motion abnormalities are seen. Normal diastolic function.    Mild mitral regurgitation. Mild tricuspid regurgitation. Estimated pulmonary artery systolic pressure   is 35 mmHg assuming a right atrial pressure of 3 mmHg. Neuro/Psych:   Negative Neuro/Psych ROS              GI/Hepatic/Renal:   (+) renal disease (s/p transplant): CRI,          ROS comment: S/P kidney transplant. Endo/Other:                     Abdominal:   (+) obese,     Abdomen: soft. Vascular: negative vascular ROS. Other Findings:               Anesthesia Plan      MAC     ASA 3     (Hx renal transplant)  Induction: intravenous. Anesthetic plan and risks discussed with patient. Plan discussed with CRNA.     Attending anesthesiologist reviewed and agrees with Preprocedure content              Yudith Izquierdo DO   3/23/2022

## 2022-03-23 NOTE — ANESTHESIA POSTPROCEDURE EVALUATION
Department of Anesthesiology  Postprocedure Note    Patient: Heriberto Hagan  MRN: [id number]  YOB: 1975  Date of evaluation: 3/23/2022  Time:  1:32 PM     Procedure Summary     Date: 03/23/22 Room / Location: [address] [place] [address] Andrew Curry  / [facility]    Anesthesia Start: 1512 Anesthesia Stop: 6911    Procedure: EGD BIOPSY (N/A ) Diagnosis:       Duodenal ulcer      Gastritis, presence of bleeding unspecified, unspecified chronicity, unspecified gastritis type      (Duodenal ulcer [K26.9] Gastritis, presence of bleeding unspecified, unspecified chronicity, unspecified gastritis type [K29.70])    Surgeons: Scott Pandey MD Responsible Provider: Sarah Gonzalez DO    Anesthesia Type: MAC ASA Status: 3          Anesthesia Type: MAC    Anthony Phase I: Anthony Score: 10    Anthony Phase II: Anthony Score: 10    Last vitals: Reviewed and per EMR flowsheets.        Anesthesia Post Evaluation    Patient location during evaluation: PACU  Patient participation: complete - patient participated  Level of consciousness: awake  Pain score: 0  Airway patency: patent  Nausea & Vomiting: no nausea and no vomiting  Complications: no  Cardiovascular status: blood pressure returned to baseline  Respiratory status: acceptable  Hydration status: euvolemic

## 2022-04-14 NOTE — PROGRESS NOTES
April 15, 2022    PATIENT: Jero Nunez  : 1975    Primary Care Provider:   Shannon Chaidez MD  P:[phone]  E:[phone]    Reason for evaluation:   Chief Complaint   Patient presents with    1 Year Follow Up    Hypertension     History of present illness:   Mr. Jero Nunez is a 48 y.o. male patient here in annual cardiovascular follow up for palpitations and history of PAF. Rod Brown denies recurrent palpitations or concerns following updated monitor, echocardiogram and stress testing last year. He has had repeat biopsy with his transplant nephrologist.  He has also had repeat EGD with his gastroenterologist and is now taking Protonix twice daily for persistent duodenal ulcer. Denies recurrent indigestion or pain into his chest.  Tolerating medication regimen without adverse effects. Non-smoker. States he is feeling really well and pleased to have more freedom without physical limitations since off dialysis.      Medical History:      Diagnosis Date    CKD (chronic kidney disease), stage III ([facility] 2012    HTN (hypertension) 2012    Obstructive sleep apnea syndrome        Surgical History:      Procedure Laterality Date    DIALYSIS FISTULA CREATION Right     cephalic AV fistula    KIDNEY TRANSPLANT  2019    UPPER GASTROINTESTINAL ENDOSCOPY N/A 2022    EGD BIOPSY performed by Eda Miller MD at [address] [facility] N/A 3/23/2022    EGD BIOPSY performed by Eda Miller MD at [address] History:  Social History     Socioeconomic History    Marital status:      Spouse name: Not on file    Number of children: Not on file    Years of education: Not on file    Highest education level: Not on file   Occupational History    Not on file   Tobacco Use    Smoking status: Former Smoker     Packs/day: 0.00     Quit date: 2013     Years since quittin.8    Smokeless tobacco: Never Used    Tobacco comment: quit date is June 28th, 2013   Vaping Use    Vaping Use: Never used   Substance and Sexual Activity    Alcohol use: Not Currently    Drug use: No    Sexual activity: Yes     Partners: Female   Other Topics Concern    Not on file   Social History Narrative    Not on file     Social Determinants of Health     Financial Resource Strain: Low Risk     Difficulty of Paying Living Expenses: Not hard at all   Food Insecurity: No Food Insecurity    Worried About [address] in the Last Year: Never true    [facility] in the Last Year: Never true   Transportation Needs:     Lack of Transportation (Medical): Not on file    Lack of Transportation (Non-Medical): Not on file   Physical Activity:     Days of Exercise per Week: Not on file    Minutes of Exercise per Session: Not on file   Stress:     Feeling of Stress : Not on file   Social Connections:     Frequency of Communication with Friends and Family: Not on file    Frequency of Social Gatherings with Friends and Family: Not on file    Attends Confucianism Services: Not on file    Active Member of [address] [place] [address] or Organizations: Not on file    Attends Club or Organization Meetings: Not on file    Marital Status: Not on file   Intimate Partner Violence:     Fear of Current or Ex-Partner: Not on file    Emotionally Abused: Not on file    Physically Abused: Not on file    Sexually Abused: Not on file   Housing Stability:     Unable to Pay for Housing in the Last Year: Not on file    Number of [place] in the Last Year: Not on file    Unstable Housing in the Last Year: Not on file        Family History:  No evidence for sudden cardiac death or premature CAD. Problem Relation Age of Onset    High Blood Pressure Mother     High Blood Pressure Sister        Medications:  [x] Medications and dosages reviewed. Prior to Admission medications    Medication Sig Start Date End Date Taking? Authorizing Provider   dilTIAZem (CARDIZEM CD) 240 MG extended release capsule  2/22/22  Yes Historical Provider, MD   NIFEdipine (PROCARDIA XL) 60 MG extended release tablet  3/15/22  Yes Historical Provider, MD   pantoprazole (PROTONIX) 40 MG tablet Take 1 tablet by mouth 2 times daily (before meals) 3/23/22  Yes Sinan Devi MD   tacrolimus (PROGRAF) 1 MG capsule 3 caps AM, 3 caps PM 12/4/20  Yes Historical Provider, MD   Cholecalciferol (VITAMIN D3) 25 MCG (1000 UT) CAPS Take 1,000 mg by mouth daily   Yes Historical Provider, MD   predniSONE (DELTASONE) 5 MG tablet Take 5 mg by mouth daily 10/28/20  Yes Historical Provider, MD   mycophenolate (CELLCEPT) 250 MG capsule Take 750 mg by mouth 2 times daily   Yes Historical Provider, MD   carvedilol (COREG) 12.5 MG tablet Take 12.5 mg by mouth daily  3/12/20  Yes Historical Provider, MD   magnesium oxide (MAG-OX) 400 MG tablet Take 400 mg by mouth daily   Yes Historical Provider, MD       Allergies:  Pollen extract     Review of Systems:    [x]Full ROS obtained and negative except as mentioned in HPI    Physical Examination:    /64   Pulse 72   Ht 5' 8\" (1.727 m)   Wt 156 lb 6.4 oz (70.9 kg)   SpO2 97%   BMI 23.78 kg/m²   Wt Readings from Last 3 Encounters:   04/15/22 156 lb 6.4 oz (70.9 kg)   03/23/22 152 lb (68.9 kg)   02/16/22 154 lb (69.9 kg)     Vitals:    04/15/22 1522   BP: 100/64   Pulse: 72   SpO2: 97%       · GENERAL: Well developed, well nourished, no acute distress  · NEUROLOGICAL: Alert and oriented x3  · PSYCH: Normal mood and affect   · SKIN: Warm and dry  · HEENT: Normocephalic, atraumatic, Sclera non-icteric, mucous membranes moist  · NECK: supple, JVP normal  · CARDIAC: Normal PMI, regular rate and rhythm, normal S1S2, no murmur, rub, or gallop  · RESPIRATORY: Normal respiratory effort, clear to auscultation bilaterally  · EXTREMITIES: no edema or clubbing, +2 pulses bilaterally   · MUSCULOSKELETAL: No joint swelling or tenderness, no chest wall tenderness  · GASTROINTESTINAL: normal bowel sounds, soft, non-tender    Labs:  Lab Review   No visits with results within 2 Month(s) from this visit.    Latest known visit with results is:   Admission on 08/05/2021, Discharged on 08/05/2021   Component Date Value    WBC 08/05/2021 3.9*    RBC 08/05/2021 4.23     Hemoglobin 08/05/2021 13.4*    Hematocrit 08/05/2021 40.8     MCV 08/05/2021 96.5     MCH 08/05/2021 31.7     MCHC 08/05/2021 32.8     RDW 08/05/2021 13.1     Platelets 60/34/9923 276     MPV 08/05/2021 7.5     Neutrophils % 08/05/2021 80.1     Lymphocytes % 08/05/2021 6.1     Monocytes % 08/05/2021 12.9     Eosinophils % 08/05/2021 0.6     Basophils % 08/05/2021 0.3     Neutrophils Absolute 08/05/2021 3.1     Lymphocytes Absolute 08/05/2021 0.2*    Monocytes Absolute 08/05/2021 0.5     Eosinophils Absolute 08/05/2021 0.0     Basophils Absolute 08/05/2021 0.0     Sodium 08/05/2021 138     Potassium reflex Magnesi* 08/05/2021 4.4     Chloride 08/05/2021 107     CO2 08/05/2021 23     Anion Gap 08/05/2021 8     Glucose 08/05/2021 124*    BUN 08/05/2021 26*    CREATININE 08/05/2021 1.6*    GFR Non- 08/05/2021 46*    GFR  08/05/2021 56*    Calcium 08/05/2021 9.8     Total Protein 08/05/2021 7.8     Albumin 08/05/2021 4.4     Albumin/Globulin Ratio 08/05/2021 1.3     Total Bilirubin 08/05/2021 0.5     Alkaline Phosphatase 08/05/2021 101     ALT 08/05/2021 16     AST 08/05/2021 23     Globulin 08/05/2021 3.4     Lipase 08/05/2021 33.0     Troponin 08/05/2021 <0.01     Color, UA 08/05/2021 Yellow     Clarity, UA 08/05/2021 SL CLOUDY*    Glucose, Ur 08/05/2021 Negative     Bilirubin Urine 08/05/2021 Negative     Ketones, Urine 08/05/2021 Negative     Specific Gravity, UA 08/05/2021 1.025     Blood, Urine 08/05/2021 Negative     pH, UA 08/05/2021 6.0     Protein, UA 08/05/2021 Negative     Urobilinogen, Urine 08/05/2021 0.2  Nitrite, Urine 08/05/2021 Negative     Leukocyte Esterase, Urine 08/05/2021 Negative     Microscopic Examination 08/05/2021 Not Indicated     Urine Type 08/05/2021 Voided     Ventricular Rate 08/05/2021 62     Atrial Rate 08/05/2021 62     P-R Interval 08/05/2021 150     QRS Duration 08/05/2021 88     Q-T Interval 08/05/2021 372     QTc Calculation (Bazett) 08/05/2021 377     P Axis 08/05/2021 80     R Axis 08/05/2021 75     T Axis 08/05/2021 71     Diagnosis 08/05/2021 EKG performed in ER and to be interpreted by ER physician. Confirmed by MD, ER ([id number]),  Baldemar Hemphill ([id number]) on 8/5/2021 12:55:39 PM        Imaging:  I have reviewed the below testing personally:    Holter monitor 2/2021: SR Min HR: 60 bpm, Max HR: 122 bpm, Rare PVC, Occasional PAC, no diary returned    EKG 3/3/21: SR, 75 bpm    Labs 3/19/21  TSH: 1.55  K: 4.2  Magnesium: 1.7  Hemoglobin: 14.3  Hematocrit: 44.2    Lipids 2/2021:  TG 83 HDL 62     MCOT 3/3/21    SR Avg HR 71   5% PVCs, 1% PACs  Rare symptoms with sinus     SPECT 5/3/21  Normal LV size and systolic function. Left ventricular ejection fraction of 67%. There is normal isotope uptake at stress and rest.  There is no evidence of myocardial ischemia or scar. Overall findings represent a low risk scan. Echo 5/3/21  Normal left ventricle size and systolic function with an estimated ejection fraction of 65%. There is mild concentric left ventricular hypertrophy. No regional wall motion abnormalities are seen. Normal diastolic function. Mild mitral regurgitation. Mild tricuspid regurgitation. Estimated pulmonary artery systolic pressure is 35 mmHg assuming a right atrial pressure of 3 mmHg    Impression/Recommendations    Mr. Lacey Cornejo is a 48 y.o. male patient with:    PAF, CHADS2-VASc score= 1, ASA (no anticoagulation)  Hypertension, controlled   ESRD, follows with Dr. Anya Euceda, renal transplant 2019 ([facility])  STEVE, compliant with CPAP  Hx.  Duodenal ulcer   Former tobacco use (>10PYH)       Renata Higgins denies palpitations or chest concerns since updated cardiac testing last Spring was stable. No change to beta blocker and calcium channel blocker. Return in about 18 months (around 10/15/2023). Patient Instructions   Continue all medications. Thank you for allowing me to participate in the care of your patient. Please do not hesitate to call. Gaston Jordan DO, [facility]  Interventional Cardiology     o: [phone]  [address] [place] [address] [address] [facility], [facility]      NOTE:  This report was transcribed using voice recognition software. Every effort was made to ensure accuracy; however, inadvertent computerized transcription errors may be present. [facility] [place] Mark Anthony Gardner, am scribing for and in the presence of DO Rosa. Signed, Mark Anthony Rule 04/15/22 4:47 PM     I, Gaston Jordan, have personally performed the services described in this documentation as scribed in my presence, and it is both accurate and complete. An electronic signature was used to authenticate this note.

## 2022-04-15 ENCOUNTER — OFFICE VISIT (OUTPATIENT)
Dept: CARDIOLOGY CLINIC | Age: 47
End: 2022-04-15
Payer: MEDICARE

## 2022-04-15 VITALS
HEART RATE: 72 BPM | HEIGHT: 68 IN | SYSTOLIC BLOOD PRESSURE: 100 MMHG | OXYGEN SATURATION: 97 % | BODY MASS INDEX: 23.7 KG/M2 | DIASTOLIC BLOOD PRESSURE: 64 MMHG | WEIGHT: 156.4 LBS

## 2022-04-15 DIAGNOSIS — G47.33 OBSTRUCTIVE SLEEP APNEA SYNDROME: ICD-10-CM

## 2022-04-15 DIAGNOSIS — I10 ESSENTIAL HYPERTENSION: ICD-10-CM

## 2022-04-15 DIAGNOSIS — I48.0 PAROXYSMAL ATRIAL FIBRILLATION (HCC): Primary | ICD-10-CM

## 2022-04-15 PROCEDURE — 1036F TOBACCO NON-USER: CPT | Performed by: INTERNAL MEDICINE

## 2022-04-15 PROCEDURE — G8427 DOCREV CUR MEDS BY ELIG CLIN: HCPCS | Performed by: INTERNAL MEDICINE

## 2022-04-15 PROCEDURE — G8420 CALC BMI NORM PARAMETERS: HCPCS | Performed by: INTERNAL MEDICINE

## 2022-04-15 PROCEDURE — 99213 OFFICE O/P EST LOW 20 MIN: CPT | Performed by: INTERNAL MEDICINE

## 2022-04-27 ENCOUNTER — HOSPITAL ENCOUNTER (EMERGENCY)
Age: 47
Discharge: HOME | End: 2022-04-27
Attending: EMERGENCY MEDICINE
Payer: COMMERCIAL

## 2022-04-27 ENCOUNTER — APPOINTMENT (OUTPATIENT)
Dept: GENERAL RADIOLOGY | Age: 47
End: 2022-04-27
Payer: COMMERCIAL

## 2022-04-27 VITALS
SYSTOLIC BLOOD PRESSURE: 138 MMHG | HEART RATE: 64 BPM | HEIGHT: 68 IN | WEIGHT: 161.9 LBS | BODY MASS INDEX: 24.54 KG/M2 | OXYGEN SATURATION: 100 % | DIASTOLIC BLOOD PRESSURE: 95 MMHG | RESPIRATION RATE: 16 BRPM | TEMPERATURE: 97.9 F

## 2022-04-27 DIAGNOSIS — S61.439A PENETRATING TRAUMATIC INJURY OF HAND: Primary | ICD-10-CM

## 2022-04-27 PROCEDURE — 6360000002 HC RX W HCPCS: Performed by: EMERGENCY MEDICINE

## 2022-04-27 PROCEDURE — 90471 IMMUNIZATION ADMIN: CPT | Performed by: EMERGENCY MEDICINE

## 2022-04-27 PROCEDURE — 73130 X-RAY EXAM OF HAND: CPT

## 2022-04-27 PROCEDURE — 99284 EMERGENCY DEPT VISIT MOD MDM: CPT

## 2022-04-27 PROCEDURE — 90715 TDAP VACCINE 7 YRS/> IM: CPT | Performed by: EMERGENCY MEDICINE

## 2022-04-27 RX ADMIN — TETANUS TOXOID, REDUCED DIPHTHERIA TOXOID AND ACELLULAR PERTUSSIS VACCINE, ADSORBED 0.5 ML: 5; 2.5; 8; 8; 2.5 SUSPENSION INTRAMUSCULAR at 16:51

## 2022-04-27 ASSESSMENT — PAIN DESCRIPTION - FREQUENCY: FREQUENCY: CONTINUOUS

## 2022-04-27 ASSESSMENT — PAIN SCALES - GENERAL: PAINLEVEL_OUTOF10: 8

## 2022-04-27 ASSESSMENT — PAIN DESCRIPTION - LOCATION: LOCATION: HAND

## 2022-04-27 ASSESSMENT — PAIN - FUNCTIONAL ASSESSMENT: PAIN_FUNCTIONAL_ASSESSMENT: 0-10

## 2022-04-27 ASSESSMENT — PAIN DESCRIPTION - ORIENTATION: ORIENTATION: RIGHT

## 2022-04-27 ASSESSMENT — PAIN DESCRIPTION - DESCRIPTORS: DESCRIPTORS: THROBBING

## 2022-04-27 ASSESSMENT — PAIN DESCRIPTION - ONSET: ONSET: GRADUAL

## 2022-04-27 ASSESSMENT — PAIN DESCRIPTION - PAIN TYPE: TYPE: ACUTE PAIN

## 2022-04-27 NOTE — ED PROVIDER NOTES
[address]          ATTENDING PHYSICIAN NOTE       Date of evaluation: 4/27/2022    Chief Complaint     Hand Injury (accidently shot self in right hand with a nail gun)      History of Present Illness     Maciel Winkler II is a 48 y.o. male who presents with right hand injury. He is left-hand dominant and was at work using a nail gun when he punctured the top of his hand just distal to the wrist.  There is no foreign body remaining. The patient was referred in for evaluation given that it was an injury at work. He reports that he has some mild increase in pain and swelling since the incident. Worse with movement of his hand and fingers. He does not want anything for pain. No other aggravating relieving factors or associated symptoms. The patient has a fistula on his right arm from previous dialysis, although he received a kidney transplant and is no longer dialysis dependent. Review of Systems     Negative for fever, vomiting, chest pain, shortness breath, abdominal pain. All other systems were reviewed and are negative, except as mentioned in HPI. Past Medical, Surgical, Family, and Social History     He has a past medical history of CKD (chronic kidney disease), stage III ([address] HTN (hypertension), and Obstructive sleep apnea syndrome. He has a past surgical history that includes Dialysis fistula creation (Right, 06/14/2017); Kidney transplant (08/09/2019); Upper gastrointestinal endoscopy (N/A, 1/12/2022); and Upper gastrointestinal endoscopy (N/A, 3/23/2022). His family history includes High Blood Pressure in his mother and sister. He reports that he quit smoking about 8 years ago. He smoked 0.00 packs per day. He has never used smokeless tobacco. He reports previous alcohol use. He reports that he does not use drugs.     Medications     Previous Medications    CARVEDILOL (COREG) 12.5 MG TABLET    Take 12.5 mg by mouth daily     CHOLECALCIFEROL (VITAMIN D3) 25 MCG (1000 UT) CAPS    Take 1,000 mg by mouth daily    DILTIAZEM (CARDIZEM CD) 240 MG EXTENDED RELEASE CAPSULE        MAGNESIUM OXIDE (MAG-OX) 400 MG TABLET    Take 400 mg by mouth daily    MYCOPHENOLATE (CELLCEPT) 250 MG CAPSULE    Take 750 mg by mouth 2 times daily    NIFEDIPINE (PROCARDIA XL) 60 MG EXTENDED RELEASE TABLET        PANTOPRAZOLE (PROTONIX) 40 MG TABLET    Take 1 tablet by mouth 2 times daily (before meals)    PREDNISONE (DELTASONE) 5 MG TABLET    Take 5 mg by mouth daily    TACROLIMUS (PROGRAF) 1 MG CAPSULE    3 caps AM, 3 caps PM       Allergies     He is allergic to pollen extract. Physical Exam     INITIAL VITALS: BP: (!) 138/95, Temp: 97.9 °F (36.6 °C), Pulse: 64, Resp: 16, SpO2: 100 %       General:  Well appearing. No acute distress. Eyes:  Pupils reactive. No discharge from eyes. ENT:  No discharge from nose. Neck:  Supple. Pulmonary:   Non-labored breathing. Musculoskeletal: Small puncture wound to dorsum of right hand just distal to the wrist.  The patient has full range of motion of the wrist.  Hand is neurovascularly intact with intact motor and sensory function in the radial, median, ulnar nerve distributions. Brisk cap refill to all 5 fingers. Skin:  No rash or laceration. Very small puncture wound as above. Neuro:  Alert and oriented x4. Moves all four extremities to command. No focal deficit. Diagnostic Results       RADIOLOGY:  Please see electronic medical record for imaging studies performed in the ED. RECENT VITALS:  BP: (!) 138/95, Temp: 97.9 °F (36.6 °C), Pulse: 64, Resp: 16     Procedures         ED Course     Nursing Notes, Past Medical Hx, Past Surgical Hx, Social Hx, Allergies, and Family Hx were reviewed.     The patient was given the following medications:  Orders Placed This Encounter   Medications    Tetanus-Diphth-Acell Pertussis (BOOSTRIX) injection 0.5 mL       CONSULTS:  None    MEDICAL DECISION Omega Phalen / Anisha Ruiz / Aliya Richards Kris Bland is a 48 y.o. male presenting with puncture wound to the right hand. Examination of the hand is normal and reassuring. X-ray of the right hand was unremarkable. The patient's tetanus was updated and his wound was cleaned. He was given wound care instructions and advised to follow-up with Workmen's Comp. Return precautions given. Clinical Impression     1. Penetrating traumatic injury of hand        Disposition     PATIENT REFERRED TO:  No follow-up provider specified.     DISCHARGE MEDICATIONS:  New Prescriptions    No medications on file       DISPOSITION Decision To Discharge 04/27/2022 04:40:16 PM          Yudith Bower MD  04/27/22 [id number]

## 2022-06-30 ENCOUNTER — TELEPHONE (OUTPATIENT)
Dept: CARDIOLOGY CLINIC | Age: 47
End: 2022-06-30

## 2022-06-30 NOTE — TELEPHONE ENCOUNTER
Patient called in asking that [facility] give him a call back, he would like to discuss the dosage of the Asprin that he is taking.       Patient can be reached at [phone]

## 2022-07-01 NOTE — TELEPHONE ENCOUNTER
Taking  mg daily and wants to see if he can switch to 81 mg. He denies bleeding issues; just wants to switch to a lower dose. He is aware BNN OOT and will address on her return.

## 2022-07-01 NOTE — TELEPHONE ENCOUNTER
Last ov  [facility] 4/15/22 please advise    PAF, CHADS2-VASc score= 1, ASA (no anticoagulation)  Hypertension, controlled   ESRD, follows with Dr. Tram Valentino, renal transplant 2019 ([facility])  STEVE, compliant with CPAP  Hx.  Duodenal ulcer   Former tobacco use (>10PYH

## 2022-08-16 ENCOUNTER — OFFICE VISIT (OUTPATIENT)
Dept: PRIMARY CARE CLINIC | Age: 47
End: 2022-08-16
Payer: MEDICARE

## 2022-08-16 VITALS
HEART RATE: 72 BPM | HEIGHT: 68 IN | DIASTOLIC BLOOD PRESSURE: 79 MMHG | WEIGHT: 157 LBS | SYSTOLIC BLOOD PRESSURE: 127 MMHG | TEMPERATURE: 97.1 F | BODY MASS INDEX: 23.79 KG/M2

## 2022-08-16 DIAGNOSIS — Z12.11 SCREENING FOR COLON CANCER: Primary | ICD-10-CM

## 2022-08-16 PROCEDURE — 1036F TOBACCO NON-USER: CPT | Performed by: INTERNAL MEDICINE

## 2022-08-16 PROCEDURE — 99214 OFFICE O/P EST MOD 30 MIN: CPT | Performed by: INTERNAL MEDICINE

## 2022-08-16 PROCEDURE — G8427 DOCREV CUR MEDS BY ELIG CLIN: HCPCS | Performed by: INTERNAL MEDICINE

## 2022-08-16 PROCEDURE — G8420 CALC BMI NORM PARAMETERS: HCPCS | Performed by: INTERNAL MEDICINE

## 2022-08-16 ASSESSMENT — ENCOUNTER SYMPTOMS
EYE DISCHARGE: 0
EYES NEGATIVE: 1
RESPIRATORY NEGATIVE: 1

## 2022-08-16 NOTE — PROGRESS NOTES
Sindhu Cali II (:  1975) is a 48 y.o. male,Established patient, here for evaluation of the following chief complaint(s):  6 Month Follow-Up and Hypertension    The patient is here today for follow-up of his blood pressure and a general checkup. He has no new complaints. His blood pressure is within normal limits. ASSESSMENT/PLAN:  1. Screening for colon cancer  -     Fecal DNA Colorectal cancer screening (Cologuard)      Return in about 6 months (around 2023). Subjective   SUBJECTIVE/OBJECTIVE:  Hypertension  This is a chronic problem. The current episode started more than 1 year ago. The problem is unchanged. The problem is controlled. Review of Systems   Constitutional:  Negative for activity change and appetite change. HENT: Negative. Eyes: Negative. Negative for discharge. Respiratory: Negative. Genitourinary:  Negative for difficulty urinating. Musculoskeletal:  Negative for arthralgias. Skin:  Negative for rash. Neurological: Negative. Psychiatric/Behavioral: Negative. Negative for agitation and confusion. The patient is not nervous/anxious. All other systems reviewed and are negative. Objective   Physical Exam  Constitutional:       Appearance: Normal appearance. He is well-developed. HENT:      Head: Normocephalic and atraumatic. Nose: Nose normal.      Mouth/Throat:      Mouth: Mucous membranes are moist.      Pharynx: Oropharynx is clear. Eyes:      Conjunctiva/sclera: Conjunctivae normal.      Pupils: Pupils are equal, round, and reactive to light. Cardiovascular:      Rate and Rhythm: Normal rate and regular rhythm. Heart sounds: Normal heart sounds. Pulmonary:      Effort: Pulmonary effort is normal.      Breath sounds: Normal breath sounds. Abdominal:      General: Abdomen is flat. Palpations: Abdomen is soft. Musculoskeletal:         General: Normal range of motion.       Cervical back: Normal range of motion and neck supple. Skin:     General: Skin is warm and dry. Neurological:      Mental Status: He is alert and oriented to person, place, and time. Psychiatric:         Behavior: Behavior normal.         Thought Content: Thought content normal.          No problem-specific Assessment & Plan notes found for this encounter. On this date 8/16/2022 I have spent 30 minutes reviewing previous notes, test results and face to face with the patient discussing the diagnosis and importance of compliance with the treatment plan as well as documenting on the day of the visit. An electronic signature was used to authenticate this note.     --Jeff Nj MD

## 2022-08-16 NOTE — PATIENT INSTRUCTIONS
You are doing well. Continue follow up with nephrology. Please get a flu shot this season at the pharmacy. See me in 6 months.

## 2023-01-12 ENCOUNTER — OFFICE VISIT (OUTPATIENT)
Dept: PRIMARY CARE CLINIC | Age: 48
End: 2023-01-12

## 2023-01-12 VITALS
WEIGHT: 166 LBS | BODY MASS INDEX: 25.16 KG/M2 | HEART RATE: 68 BPM | TEMPERATURE: 97.7 F | DIASTOLIC BLOOD PRESSURE: 87 MMHG | SYSTOLIC BLOOD PRESSURE: 138 MMHG | HEIGHT: 68 IN

## 2023-01-12 DIAGNOSIS — Z12.5 SCREENING FOR PROSTATE CANCER: Primary | ICD-10-CM

## 2023-01-12 DIAGNOSIS — I48.0 PAROXYSMAL ATRIAL FIBRILLATION (HCC): ICD-10-CM

## 2023-01-12 DIAGNOSIS — R73.9 HYPERGLYCEMIA: ICD-10-CM

## 2023-01-12 DIAGNOSIS — N18.6 ESRD (END STAGE RENAL DISEASE) ON DIALYSIS (HCC): ICD-10-CM

## 2023-01-12 DIAGNOSIS — I10 ESSENTIAL HYPERTENSION: ICD-10-CM

## 2023-01-12 DIAGNOSIS — Z94.89 TRANSPLANT RECIPIENT: ICD-10-CM

## 2023-01-12 DIAGNOSIS — Z99.2 ESRD (END STAGE RENAL DISEASE) ON DIALYSIS (HCC): ICD-10-CM

## 2023-01-12 LAB — HBA1C MFR BLD: 4.5 %

## 2023-01-12 ASSESSMENT — PATIENT HEALTH QUESTIONNAIRE - PHQ9
SUM OF ALL RESPONSES TO PHQ9 QUESTIONS 1 & 2: 0
2. FEELING DOWN, DEPRESSED OR HOPELESS: 0
1. LITTLE INTEREST OR PLEASURE IN DOING THINGS: 0
SUM OF ALL RESPONSES TO PHQ QUESTIONS 1-9: 0

## 2023-01-12 ASSESSMENT — ENCOUNTER SYMPTOMS
EYES NEGATIVE: 1
EYE DISCHARGE: 0
RESPIRATORY NEGATIVE: 1

## 2023-01-12 NOTE — PROGRESS NOTES
Cade Goins II (:  1975) is a 50 y.o. male,Established patient, here for evaluation of the following chief complaint(s):  GI Problem         ASSESSMENT/PLAN:  1. Screening for prostate cancer  -     PSA Screening; Future  2. ESRD (end stage renal disease) on dialysis [facility])  Assessment & Plan:   Monitored by specialist- no acute findings meriting change in the plan    3. Paroxysmal atrial fibrillation (HCC)  Assessment & Plan:   Monitored by specialist- no acute findings meriting change in the plan    4. Essential hypertension  Assessment & Plan:   Borderline controlled,   Orders:  -     TSH with Reflex to FT4; Future  -     Comprehensive Metabolic Panel; Future  -     Urinalysis; Future  -     Lipid, Fasting; Future  -     CBC with Auto Differential; Future  -     Vitamin D 25 Hydroxy; Future  5. Hyperglycemia  -     POCT glycosylated hemoglobin (Hb A1C)  6. Transplant recipient  Assessment & Plan:   Well-controlled, continue current medications      Return in about 3 months (around 2023). Subjective   SUBJECTIVE/OBJECTIVE:  GI Problem  Primary symptoms comment: hx of ulcer and will see GI next week. Episode onset: No abdominal symptoms today. Hypertension  This is a chronic problem. The current episode started more than 1 year ago. The problem has been waxing and waning since onset. The problem is resistant. There are no compliance problems. Review of Systems   Constitutional:  Negative for activity change and appetite change. HENT: Negative. Eyes: Negative. Negative for discharge. Respiratory: Negative. Genitourinary:  Negative for difficulty urinating. Neurological: Negative. Psychiatric/Behavioral: Negative. Negative for agitation and confusion. The patient is not nervous/anxious. All other systems reviewed and are negative. Objective   Physical Exam  Constitutional:       Appearance: Normal appearance. He is well-developed.    HENT:      Head: Normocephalic and atraumatic. Nose: Nose normal.      Mouth/Throat:      Mouth: Mucous membranes are moist.      Pharynx: Oropharynx is clear. Eyes:      Conjunctiva/sclera: Conjunctivae normal.      Pupils: Pupils are equal, round, and reactive to light. Cardiovascular:      Rate and Rhythm: Normal rate and regular rhythm. Heart sounds: Normal heart sounds. Pulmonary:      Effort: Pulmonary effort is normal.      Breath sounds: Normal breath sounds. Abdominal:      General: Abdomen is flat. Palpations: Abdomen is soft. Musculoskeletal:         General: Normal range of motion. Cervical back: Normal range of motion and neck supple. Skin:     General: Skin is warm and dry. Neurological:      Mental Status: He is alert and oriented to person, place, and time. Psychiatric:         Behavior: Behavior normal.         Thought Content: Thought content normal.          Paroxysmal atrial fibrillation ([address]   Monitored by specialist- no acute findings meriting change in the plan      ESRD (end stage renal disease) on dialysis [facility])   Monitored by specialist- no acute findings meriting change in the plan      Essential hypertension   Borderline controlled,     Transplant recipient   Well-controlled, continue current medications     On this date 1/12/2023 I have spent 30 minutes reviewing previous notes, test results and face to face with the patient discussing the diagnosis and importance of compliance with the treatment plan as well as documenting on the day of the visit. An electronic signature was used to authenticate this note.     --Desiree Luke MD

## 2023-01-12 NOTE — PATIENT INSTRUCTIONS
A fingerstick A1c will be done today to screen for diabetes. See me again in 3 months for a blood pressure check. Continue your current medications for now. Lab review today.

## 2023-09-05 NOTE — PROGRESS NOTES
Ellis Scott MD, FAA, [facility]  [facility]  [address] [facility] [address] [facility]  3rd Floor, [address] [facility], [address] [phone]   [address] [place] [address] [address] [place]. [address] [phone]       [id number] MEDICINE    Subjective:     Patient ID: Lindy Ormond is a 35 y.o. male. Chief Complaint   Patient presents with    Snoring       HPI:        Lindy Ormond is a 35 y.o. male Referred by Dr. John Moody  for a sleep evaluation. He complains of snoring, snorting, choking, periods of not breathing, completely or partially paralyzed while falling asleep or waking up, excessive daytime sleepiness but he denies knees buckling with laughing. Symptoms began several months ago, gradually worsening since that time. Previous evaluation and treatment has included- none. Per patient the listed Co-morbidities are stable and fluctuating at this time. Echo:    Summary   -Normal left ventricle size, wall thickness and systolic function with an   estimated ejection fraction of 55%.   -Trivial mitral regurgitation is present.   -There is trivial tricuspid regurgitation with RVSP estimated at 36 mmHg. Nocturia   1 per night.    Having  HA upon waking  No   Dry mouth upon waking   No   Congestion upon waking   No   Nose Bleeds  No   Using Sleep Aides  no   Difficulties falling asleep  No   Difficulties staying asleep  No   Approximate time to bed  11pm-12am   Approximate wake time 5:30am   Taking Naps  no   If taking naps usual length  na   Drowsy when driving  No   Does patient carry a DOT/CDL  No   Does patient carry FAA/Pil[employer] License   No     Previous Report(s) Reviewed: See Echo results above     [place] - Total score: 0    Caffeine Intake - 0 cups of caffeinated coffee per day(s)    Social History     Social History    Marital status:      Spouse name: N/A    Number of children: N/A    Years of education: N/A I presented the patient at the [facility]'s epilepsy surgery conference today.  We discussed possibly doing intracranial monitoring however this would require extensive coverage of the bifrontal and bitemporal head regions.  We briefly discussed RASHID but decided against it.  I felt like the best option would be NeuroPace.  I would recommend 3 depth electrodes to be placed.  One on the right side in the central median nucleus of the thalamus and 2 on the left side to be hooked up.  1 in the left central median nucleus of the thalamus and 1 in the left hippocampus.  Mom confirms that his seizure always present as right arm jerking.  Mom would like to think about things before neurosurgery referral is placed.     Occupational History    Not on file. Social History Main Topics    Smoking status: Former Smoker     Packs/day: 0.00     Quit date: 6/28/2013    Smokeless tobacco: Former User      Comment: quit date is June 28th, 2013    Alcohol use Yes      Comment: occ    Drug use: No    Sexual activity: Yes     Partners: Female     Other Topics Concern    Not on file     Social History Narrative       Prior to Admission medications    Medication Sig Start Date End Date Taking? Authorizing Provider   losartan (COZAAR) 50 MG tablet Take 1 tablet by mouth daily 9/25/17  Yes Darden Boas, CNP   vitamin D (CHOLECALCIFEROL) 5000 units CAPS capsule Take 1 capsule by mouth daily 9/25/17  Yes Darden Boas, CNP   diltiazem (CARDIZEM CD) 120 MG extended release capsule Take 1 capsule by mouth daily 8/29/17  Yes Jones Melton MD   aspirin EC 81 MG EC tablet Take 1 tablet by mouth daily 8/29/17  Yes Jones Melton MD   metoprolol succinate (TOPROL XL) 25 MG extended release tablet Take 1 tablet by mouth daily 8/22/17  Yes Madai Morejon MD   RENVELA 800 MG tablet TK 2 TS PO TID WITH MEALS 7/11/17  Yes Historical Provider, MD   HYDROcodone-acetaminophen (NORCO) 5-325 MG per tablet Take 2 tablets by mouth every 6 hours as needed for Pain . 6/14/17  Yes Shayy Ruelas MD   darbepoetin dalia-polysorbate (ARANESP) 200 MCG/0.4ML SOSY injection Infuse 0.4 mLs intravenously once a week On fridays.  2/20/17  Yes Mathew Marsh MD       Allergies as of 10/05/2017 - Review Complete 10/05/2017   Allergen Reaction Noted    Pollen extract  04/20/2012       Patient Active Problem List   Diagnosis    CKD (chronic kidney disease), stage III    HTN (hypertension)    Proteinuria    Subcutaneous nodule of breast    Gynecomastia, male    [facility]ER[facility]) [facility]    ESRD (end stage renal disease) on dialysis ([facility]    Atrial fibrillation with RVR (HCC)    Anemia of chronic renal failure, stage 5 [facility])       Past Medical History:   Diagnosis Date    CKD (chronic kidney disease), stage III 4/20/2012    HTN (hypertension) 4/20/2012       Past Surgical History:   Procedure Laterality Date    DIALYSIS FISTULA CREATION Right 46/76/3579    cephalic AV fistula       Family History   Problem Relation Age of Onset    High Blood Pressure Mother     High Blood Pressure Sister        Review of Systems   Constitutional: Positive for fatigue. Negative for appetite change, chills and fever. HENT: Negative for congestion, nosebleeds, rhinorrhea and sinus pressure. Respiratory: Negative for apnea, cough and shortness of breath. Cardiovascular: Negative for chest pain and palpitations. Gastrointestinal: Negative for abdominal distention and abdominal pain. Neurological: Negative for dizziness and headaches. Psychiatric/Behavioral: Positive for sleep disturbance. Objective:     Vitals:  Weight BMI   Wt Readings from Last 3 Encounters:   10/05/17 170 lb (77.1 kg)   09/15/17 170 lb (77.1 kg)   08/29/17 171 lb 8.3 oz (77.8 kg)    Body mass index is 25.85 kg/(m^2). BP HR SaO2   BP Readings from Last 3 Encounters:   10/05/17 111/70   09/15/17 117/74   08/29/17 (!) 139/91    Pulse Readings from Last 3 Encounters:   10/05/17 70   09/15/17 71   08/29/17 82    SpO2 Readings from Last 3 Encounters:   10/05/17 97%   09/15/17 96%   08/29/17 100%        Physical Exam   Constitutional: He is oriented to person, place, and time. He appears well-developed and well-nourished. No distress. HENT:   Head: Normocephalic and atraumatic. Eyes: Conjunctivae are normal. Pupils are equal, round, and reactive to light. Neck: Normal range of motion. Cardiovascular: Normal rate, regular rhythm and normal heart sounds. Pulmonary/Chest: Effort normal. No respiratory distress. Neurological: He is alert and oriented to person, place, and time. Psychiatric: He has a normal mood and affect.  His behavior is normal. Nursing note and vitals reviewed. Assessment:   Tonsils:   absent bilaterally, normal appearance  Post. Pharynx:   normal mucosa         1. Snoring  Home Sleep Study    Needing follow up    2. Atrial fibrillation with RVR ([address] Stable Home Sleep Study   3. Essential hypertension Stable Home Sleep Study   4. CKD (chronic kidney disease), stage III Stable Home Sleep Study   5. Daytime hypersomnolence  Home Sleep Study    needing follow up        The chronic medical conditions listed are directly related to the primary diagnosis listed above. The management of the primary diagnosis affects the secondary diagnosis and vice versa. Plan:   - Reviewed old records, pertinent data listed above. - Educated the patient on differential diagnosis includes but not limited to: STEVE, PLMD's, narcolepsy, parasomnias.    - Reviewed STEVE (which is the highest likelihood diagnosis): pathophysiology, diagnosis, complications and treatment. - Instructed him not to drive if drowsy. - Continue medications per his PCP and other physicians.   - Limit caffeine use after 3pm.   - Will do insurance mandated HST  - Follow up results phone call will be made approximately 1 wk follow up after study to review his results.   - Will use [employer] for supplies  - Follow up 10 weeks       Orders Placed This Encounter   Procedures    Home Sleep Study       Dereck Crum, JANAY, RN, CNP

## (undated) DEVICE — FORCEPS BX L240CM JAW DIA2.8MM L CAP W/ NDL MIC MESH TOOTH

## (undated) DEVICE — MASK POM PROCEDURE OXY W/ HI CONCENTRATION CO2 MONITOR

## (undated) DEVICE — MASK CAPNOGRAPHY AD W35IN DIA58IN SAMP LN L10FT O2 LN